# Patient Record
Sex: MALE | Race: WHITE | Employment: PART TIME | URBAN - NONMETROPOLITAN AREA
[De-identification: names, ages, dates, MRNs, and addresses within clinical notes are randomized per-mention and may not be internally consistent; named-entity substitution may affect disease eponyms.]

---

## 2021-12-28 ENCOUNTER — HOSPITAL ENCOUNTER (EMERGENCY)
Age: 66
Discharge: HOME | End: 2021-12-28

## 2021-12-28 VITALS
HEART RATE: 91 BPM | SYSTOLIC BLOOD PRESSURE: 156 MMHG | BODY MASS INDEX: 20.09 KG/M2 | RESPIRATION RATE: 20 BRPM | TEMPERATURE: 97.5 F | OXYGEN SATURATION: 97 % | DIASTOLIC BLOOD PRESSURE: 98 MMHG | HEIGHT: 76 IN | WEIGHT: 165 LBS

## 2021-12-28 DIAGNOSIS — U07.1 COVID-19 VIRUS INFECTION: Primary | ICD-10-CM

## 2021-12-28 LAB
FLU A ANTIGEN: NEGATIVE
FLU B ANTIGEN: NEGATIVE
SARS-COV-2, NAA: DETECTED

## 2021-12-28 PROCEDURE — 87804 INFLUENZA ASSAY W/OPTIC: CPT

## 2021-12-28 PROCEDURE — 99213 OFFICE O/P EST LOW 20 MIN: CPT | Performed by: EMERGENCY MEDICINE

## 2021-12-28 PROCEDURE — 99203 OFFICE O/P NEW LOW 30 MIN: CPT

## 2021-12-28 PROCEDURE — 87635 SARS-COV-2 COVID-19 AMP PRB: CPT

## 2021-12-28 ASSESSMENT — ENCOUNTER SYMPTOMS
DIARRHEA: 0
ABDOMINAL PAIN: 0
RHINORRHEA: 1
NAUSEA: 0
VOMITING: 0
BACK PAIN: 0
COUGH: 1
SHORTNESS OF BREATH: 0

## 2021-12-28 NOTE — ED PROVIDER NOTES
[place] [address]  Urgent Care Encounter       CHIEF COMPLAINT       Chief Complaint   Patient presents with    Covid Testing       Nurses Notes reviewed and I agree except as noted in the HPI. HISTORY OF PRESENT ILLNESS   Mikala Joy is a 61 y.o. male who presents for fatigue, mild sinus congestion, occasional cough. Patient states symptoms began yesterday. He states he was not feeling well and went to bed yesterday afternoon. He slept throughout the entire afternoon, evening and night and woke up this morning. He states that is unlike him. Patient is concerned for COVID-19. He has not been previously vaccinated. No known exposure. Patient is a current everyday smoker. States he currently does not have a primary care provider. HPI    REVIEW OF SYSTEMS     Review of Systems   Constitutional: Positive for fatigue. Negative for chills and fever. HENT: Positive for rhinorrhea. Respiratory: Positive for cough. Negative for shortness of breath. Cardiovascular: Negative for chest pain. Gastrointestinal: Negative for abdominal pain, diarrhea, nausea and vomiting. Musculoskeletal: Negative for back pain. Neurological: Negative for dizziness and headaches. Psychiatric/Behavioral: Negative for behavioral problems. PAST MEDICAL HISTORY   No past medical history on file. SURGICALHISTORY     Patient  has a past surgical history that includes Dental surgery. CURRENT MEDICATIONS       Previous Medications    No medications on file       ALLERGIES     Patient is has No Known Allergies. Patients   There is no immunization history on file for this patient. FAMILY HISTORY     Patient's family history includes Other in his father. SOCIAL HISTORY     Patient  reports that he has been smoking cigarettes. He has been smoking about 2.00 packs per day. He has never used smokeless tobacco. He reports current alcohol use. He reports that he does not use drugs.     PHYSICAL EXAM     ED TRIAGE VITALS  BP: (!) 156/98, Temp: 97.5 °F (36.4 °C), Pulse: 91, Resp: 20, SpO2: 97 %,Estimated body mass index is 20.08 kg/m² as calculated from the following:    Height as of this encounter: 6' 4\" (1.93 m). Weight as of this encounter: 165 lb (74.8 kg). ,No LMP for male patient. Physical Exam  Constitutional:       General: He is not in acute distress. Appearance: He is normal weight. He is not ill-appearing. HENT:      Nose: Rhinorrhea present. Mouth/Throat:      Mouth: Mucous membranes are moist.   Cardiovascular:      Rate and Rhythm: Normal rate and regular rhythm. Pulses: Normal pulses. Heart sounds: Normal heart sounds. Pulmonary:      Effort: Pulmonary effort is normal. No respiratory distress. Breath sounds: Normal breath sounds. No wheezing or rhonchi. Abdominal:      General: Abdomen is flat. Bowel sounds are normal. There is no distension. Skin:     General: Skin is warm and dry. Capillary Refill: Capillary refill takes less than 2 seconds. Neurological:      General: No focal deficit present. Mental Status: He is alert.    Psychiatric:         Mood and Affect: Mood normal.         Behavior: Behavior normal.         DIAGNOSTIC RESULTS     Labs:  Results for orders placed or performed during the hospital encounter of 12/28/21   COVID-19, Rapid   Result Value Ref Range    SARS-CoV-2, GILLIAN DETECTED (AA) NOT DETECTED   Rapid influenza A/B antigens   Result Value Ref Range    Flu A Antigen Negative NEGATIVE    Flu B Antigen Negative NEGATIVE       IMAGING:    No orders to display         EKG:      URGENT CARE COURSE:     Vitals:    12/28/21 0946 12/28/21 0947   BP:  (!) 156/98   Pulse: 91    Resp: 20    Temp: 97.5 °F (36.4 °C)    SpO2: 97%    Weight: 165 lb (74.8 kg)    Height: 6' 4\" (1.93 m)        Medications - No data to display         PROCEDURES:  None    FINAL IMPRESSION      1. COVID-19 virus infection          DISPOSITION/ PLAN     Patient presents for what is determined to be COVID-19 viral infection. Fatigue is his main concern. He is afebrile. No respiratory distress. Patient currently does not have primary care provider. I did discuss monoclonal antibodies with the patient. Advised if symptoms begin to worsen to go to the emergency department for treatment as he is at higher risk for Covid complications with his age and smoking status. PATIENT REFERRED TO:  Brandt Peña MD  [address] [place] [id number]      DISCHARGE MEDICATIONS:  New Prescriptions    No medications on file       Discontinued Medications    No medications on file       There are no discharge medications for this patient.       FLAVIO Napier - CNP    (Please note that portions of this note were completed with a voice recognition program. Efforts were made to edit the dictations but occasionally words are mis-transcribed.)          FLAVIO Napier - CNP  12/28/21 7099

## 2021-12-28 NOTE — ED NOTES
Discharge assessment complete. No changes. All discharge education and information given. Pt instructed to go to ED for any shortness of breath, chest pain or vomiting, diarrhea, Verbalized Understanding. Left stable. All belongings with patient.      Julius Barrera, LPN  70/91/89 7919

## 2021-12-29 ENCOUNTER — TELEPHONE (OUTPATIENT)
Dept: FAMILY MEDICINE CLINIC | Age: 66
End: 2021-12-29

## 2021-12-29 ENCOUNTER — CARE COORDINATION (OUTPATIENT)
Dept: CARE COORDINATION | Age: 66
End: 2021-12-29

## 2022-03-20 ENCOUNTER — APPOINTMENT (OUTPATIENT)
Dept: GENERAL RADIOLOGY | Age: 67
End: 2022-03-20

## 2022-03-20 ENCOUNTER — APPOINTMENT (OUTPATIENT)
Dept: CT IMAGING | Age: 67
End: 2022-03-20

## 2022-03-20 ENCOUNTER — HOSPITAL ENCOUNTER (EMERGENCY)
Age: 67
Discharge: HOME | End: 2022-03-21
Attending: EMERGENCY MEDICINE

## 2022-03-20 VITALS
HEIGHT: 76 IN | SYSTOLIC BLOOD PRESSURE: 183 MMHG | DIASTOLIC BLOOD PRESSURE: 119 MMHG | TEMPERATURE: 98.3 F | RESPIRATION RATE: 20 BRPM | HEART RATE: 99 BPM | OXYGEN SATURATION: 97 % | WEIGHT: 165 LBS | BODY MASS INDEX: 20.09 KG/M2
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
## 2022-03-20 DIAGNOSIS — M25.551 RIGHT HIP PAIN: Primary | ICD-10-CM

## 2022-03-20 LAB
ALBUMIN SERPL-MCNC: 4.3 G/DL (ref 3.5–5.1)
ALP BLD-CCNC: 138 U/L (ref 38–126)
ALT SERPL-CCNC: 11 U/L (ref 11–66)
ANION GAP SERPL CALCULATED.3IONS-SCNC: 15 MEQ/L (ref 8–16)
AST SERPL-CCNC: 9 U/L (ref 5–40)
BASOPHILS # BLD: 0.4 %
BASOPHILS ABSOLUTE: 0.1 THOU/MM3 (ref 0–0.1)
BILIRUB SERPL-MCNC: 0.7 MG/DL (ref 0.3–1.2)
BUN BLDV-MCNC: 22 MG/DL (ref 7–22)
CALCIUM SERPL-MCNC: 10.2 MG/DL (ref 8.5–10.5)
CHLORIDE BLD-SCNC: 101 MEQ/L (ref 98–111)
CO2: 24 MEQ/L (ref 23–33)
CREAT SERPL-MCNC: 0.8 MG/DL (ref 0.4–1.2)
EOSINOPHIL # BLD: 0.1 %
EOSINOPHILS ABSOLUTE: 0 THOU/MM3 (ref 0–0.4)
ERYTHROCYTE [DISTWIDTH] IN BLOOD BY AUTOMATED COUNT: 13.2 % (ref 11.5–14.5)
ERYTHROCYTE [DISTWIDTH] IN BLOOD BY AUTOMATED COUNT: 48.1 FL (ref 35–45)
GFR SERPL CREATININE-BSD FRML MDRD: > 90 ML/MIN/1.73M2
GLUCOSE BLD-MCNC: 109 MG/DL (ref 70–108)
HCT VFR BLD CALC: 44.5 % (ref 42–52)
HEMOGLOBIN: 14.3 GM/DL (ref 14–18)
IMMATURE GRANS (ABS): 0.14 THOU/MM3 (ref 0–0.07)
IMMATURE GRANULOCYTES: 0.8 %
LYMPHOCYTES # BLD: 4.5 %
LYMPHOCYTES ABSOLUTE: 0.7 THOU/MM3 (ref 1–4.8)
MAGNESIUM: 1.9 MG/DL (ref 1.6–2.4)
MCH RBC QN AUTO: 31.8 PG (ref 26–33)
MCHC RBC AUTO-ENTMCNC: 32.1 GM/DL (ref 32.2–35.5)
MCV RBC AUTO: 99.1 FL (ref 80–94)
MONOCYTES # BLD: 11.8 %
MONOCYTES ABSOLUTE: 1.9 THOU/MM3 (ref 0.4–1.3)
NUCLEATED RED BLOOD CELLS: 0 /100 WBC
OSMOLALITY CALCULATION: 283.3 MOSMOL/KG (ref 275–300)
PLATELET # BLD: 277 THOU/MM3 (ref 130–400)
PMV BLD AUTO: 10.8 FL (ref 9.4–12.4)
POTASSIUM REFLEX MAGNESIUM: 3.3 MEQ/L (ref 3.5–5.2)
RBC # BLD: 4.49 MILL/MM3 (ref 4.7–6.1)
SEG NEUTROPHILS: 82.4 %
SEGMENTED NEUTROPHILS ABSOLUTE COUNT: 13.6 THOU/MM3 (ref 1.8–7.7)
SODIUM BLD-SCNC: 140 MEQ/L (ref 135–145)
TOTAL PROTEIN: 7.9 G/DL (ref 6.1–8)
TROPONIN T: < 0.01 NG/ML
WBC # BLD: 16.5 THOU/MM3 (ref 4.8–10.8)

## 2022-03-20 PROCEDURE — 73700 CT LOWER EXTREMITY W/O DYE: CPT

## 2022-03-20 PROCEDURE — 93005 ELECTROCARDIOGRAM TRACING: CPT | Performed by: EMERGENCY MEDICINE

## 2022-03-20 PROCEDURE — 36415 COLL VENOUS BLD VENIPUNCTURE: CPT

## 2022-03-20 PROCEDURE — 80053 COMPREHEN METABOLIC PANEL: CPT

## 2022-03-20 PROCEDURE — 2500000003 HC RX 250 WO HCPCS: Performed by: EMERGENCY MEDICINE

## 2022-03-20 PROCEDURE — 96374 THER/PROPH/DIAG INJ IV PUSH: CPT

## 2022-03-20 PROCEDURE — 84484 ASSAY OF TROPONIN QUANT: CPT

## 2022-03-20 PROCEDURE — 6360000002 HC RX W HCPCS: Performed by: EMERGENCY MEDICINE

## 2022-03-20 PROCEDURE — 85025 COMPLETE CBC W/AUTO DIFF WBC: CPT

## 2022-03-20 PROCEDURE — 73502 X-RAY EXAM HIP UNI 2-3 VIEWS: CPT

## 2022-03-20 PROCEDURE — 83735 ASSAY OF MAGNESIUM: CPT

## 2022-03-20 PROCEDURE — 99285 EMERGENCY DEPT VISIT HI MDM: CPT

## 2022-03-20 PROCEDURE — 96375 TX/PRO/DX INJ NEW DRUG ADDON: CPT

## 2022-03-20 RX ORDER — KETOROLAC TROMETHAMINE 30 MG/ML
15 INJECTION, SOLUTION INTRAMUSCULAR; INTRAVENOUS ONCE
Status: COMPLETED | OUTPATIENT
Start: 2022-03-20 | End: 2022-03-20

## 2022-03-20 RX ORDER — LABETALOL 20 MG/4 ML (5 MG/ML) INTRAVENOUS SYRINGE
10 ONCE
Status: COMPLETED | OUTPATIENT
Start: 2022-03-20 | End: 2022-03-20

## 2022-03-20 RX ADMIN — KETOROLAC TROMETHAMINE 15 MG: 30 INJECTION, SOLUTION INTRAMUSCULAR; INTRAVENOUS at 23:36

## 2022-03-20 RX ADMIN — LABETALOL 20 MG/4 ML (5 MG/ML) INTRAVENOUS SYRINGE 10 MG: at 21:58

## 2022-03-20 ASSESSMENT — PAIN - FUNCTIONAL ASSESSMENT
PAIN_FUNCTIONAL_ASSESSMENT: 0-10

## 2022-03-20 ASSESSMENT — PAIN DESCRIPTION - LOCATION: LOCATION: HIP

## 2022-03-20 ASSESSMENT — PAIN SCALES - GENERAL
PAINLEVEL_OUTOF10: 10

## 2022-03-20 ASSESSMENT — PAIN DESCRIPTION - ORIENTATION: ORIENTATION: RIGHT

## 2022-03-20 ASSESSMENT — PAIN DESCRIPTION - PAIN TYPE: TYPE: ACUTE PAIN

## 2022-03-21 LAB
EKG ATRIAL RATE: 94 BPM
EKG P AXIS: 52 DEGREES
EKG P-R INTERVAL: 198 MS
EKG Q-T INTERVAL: 374 MS
EKG QRS DURATION: 94 MS
EKG QTC CALCULATION (BAZETT): 467 MS
EKG R AXIS: -13 DEGREES
EKG T AXIS: 55 DEGREES
EKG VENTRICULAR RATE: 94 BPM

## 2022-03-21 PROCEDURE — 93010 ELECTROCARDIOGRAM REPORT: CPT | Performed by: INTERNAL MEDICINE

## 2022-03-21 RX ORDER — CYCLOBENZAPRINE HCL 10 MG
10 TABLET ORAL 3 TIMES DAILY PRN
Qty: 30 TABLET | Refills: 0 | Status: SHIPPED | OUTPATIENT
Start: 2022-03-21 | End: 2022-03-31

## 2022-03-21 RX ORDER — IBUPROFEN 600 MG/1
600 TABLET ORAL EVERY 6 HOURS PRN
Qty: 120 TABLET | Refills: 0 | Status: SHIPPED | OUTPATIENT
Start: 2022-03-21 | End: 2022-06-28

## 2022-03-21 ASSESSMENT — ENCOUNTER SYMPTOMS
BACK PAIN: 0
TROUBLE SWALLOWING: 0
EYE REDNESS: 0
SHORTNESS OF BREATH: 0
NAUSEA: 0
ABDOMINAL PAIN: 0
VOMITING: 0
COUGH: 0

## 2022-03-21 NOTE — ED PROVIDER NOTES
[facility] [address] [address] EMERGENCY DEPT    EMERGENCY MEDICINE     Pt Name: Sheila Valdes  MRN: [id number]  [place] 1955  Date of evaluation: 3/20/2022  Provider: Ranulfo Dahl MD,     [address] [place] [address]       Chief Complaint   Patient presents with   Faizan Kugel    Hip Pain     Right       HISTORY OF PRESENT ILLNESS    Sheila Valdes is a pleasant 61 y.o. male who presents to the emergency department from home via EMS after falling at work. Patient states that his legs got weak and he fell onto his right hip. Patient states that this happened around 4 PM.  He has been experiencing pain in his right hip and his chin. Patient states that he has been having weakness in his legs for some time and occasionally they do give out. He stated that this was the first day at his new job and he had been walking for quite a period of time. He states that he has hurt his knee in the past and that is why his legs often give out on him. He denies being on any blood thinners, loss of consciousness or hitting his head. He did hit his chin. Patient states he had difficulty ambulating on the right hip after the injury. Triage notes and Nursing notes were reviewed by myself. Any discrepancies are addressed above. PAST MEDICAL HISTORY   History reviewed. No pertinent past medical history. SURGICAL HISTORY       Past Surgical History:   Procedure Laterality Date    DENTAL SURGERY      dental       CURRENT MEDICATIONS       Discharge Medication List as of 3/21/2022 12:03 AM          ALLERGIES     Patient has no known allergies.     FAMILY HISTORY       Family History   Problem Relation Age of Onset    Other Father         pulmonary fibrosisi        SOCIAL HISTORY       Social History     Socioeconomic History    Marital status:      Spouse name: None    Number of children: 3    Years of education: 15    Highest education level: None   Occupational History    Occupation: manager   Tobacco Use    Smoking status: Current Every Day Smoker     Packs/day: 2.00     Types: Cigarettes    Smokeless tobacco: Never Used    Tobacco comment: would like to try to quit,needs help   Substance and Sexual Activity    Alcohol use: Not Currently     Comment: daily    Drug use: No    Sexual activity: None   Other Topics Concern    None   Social History Narrative    None     Social Determinants of Health     Financial Resource Strain:     Difficulty of Paying Living Expenses: Not on file   Food Insecurity:     Worried About Running Out of Food in the Last Year: Not on file    Suzan of Food in the Last Year: Not on file   Transportation Needs:     Lack of Transportation (Medical): Not on file    Lack of Transportation (Non-Medical): Not on file   Physical Activity:     Days of Exercise per Week: Not on file    Minutes of Exercise per Session: Not on file   Stress:     Feeling of Stress : Not on file   Social Connections:     Frequency of Communication with Friends and Family: Not on file    Frequency of Social Gatherings with Friends and Family: Not on file    Attends Congregation Services: Not on file    Active Member of [address] [place] [address] or Organizations: Not on file    Attends Club or Organization Meetings: Not on file    Marital Status: Not on file   Intimate Partner Violence:     Fear of Current or Ex-Partner: Not on file    Emotionally Abused: Not on file    Physically Abused: Not on file    Sexually Abused: Not on file   Housing Stability:     Unable to Pay for Housing in the Last Year: Not on file    Number of [place] in the Last Year: Not on file    Unstable Housing in the Last Year: Not on file       REVIEW OF SYSTEMS     Review of Systems   Constitutional: Negative for fatigue and fever. HENT: Negative for congestion and trouble swallowing. Eyes: Negative for redness. Respiratory: Negative for cough and shortness of breath. Cardiovascular: Negative for chest pain.    Gastrointestinal: Negative for abdominal pain, nausea and vomiting. Genitourinary: Negative for difficulty urinating. Musculoskeletal: Positive for arthralgias and gait problem. Negative for back pain. Skin: Positive for wound. Negative for rash. Allergic/Immunologic: Negative for immunocompromised state. Neurological: Negative for light-headedness and headaches. Hematological: Does not bruise/bleed easily. Except as noted above the remainder of the review of systems was reviewed and is. PHYSICAL EXAM    (up to 7 for level 4, 8 or more for level 5)     ED Triage Vitals [03/20/22 2043]   BP Temp Temp Source Pulse Resp SpO2 Height Weight   (!) 215/129 98.3 °F (36.8 °C) Oral 99 18 98 % 6' 4\" (1.93 m) 165 lb (74.8 kg)       Physical Exam  Vitals and nursing note reviewed. Constitutional:       General: He is not in acute distress. Appearance: He is normal weight. He is not ill-appearing. HENT:      Head: Normocephalic. Abrasion present. No raccoon eyes, Weinberg's sign or contusion. Comments: Mild abrasion to right chin. Mouth/Throat:      Mouth: Mucous membranes are moist.      Dentition: Has dentures. Pharynx: Oropharynx is clear. Comments: No loose teeth as patient has dentures  Eyes:      Extraocular Movements: Extraocular movements intact. Pupils: Pupils are equal, round, and reactive to light. Cardiovascular:      Rate and Rhythm: Normal rate and regular rhythm. Heart sounds: No murmur heard. Pulmonary:      Effort: Pulmonary effort is normal. No respiratory distress. Breath sounds: Normal breath sounds. No decreased breath sounds. Abdominal:      General: Bowel sounds are normal.      Palpations: Abdomen is soft. Tenderness: There is no abdominal tenderness. There is no guarding or rebound. Genitourinary:     Testes:         Right: Testicular hydrocele present. Comments: Large hydrocele  Musculoskeletal:      Cervical back: Neck supple.  No spinous process tenderness or muscular Abnormal; Notable for the following components:    Glucose 109 (*)     Potassium reflex Magnesium 3.3 (*)     Alkaline Phosphatase 138 (*)     All other components within normal limits   TROPONIN   ANION GAP   GLOMERULAR FILTRATION RATE, ESTIMATED   MAGNESIUM   OSMOLALITY       All other labs were within normal range or not returned as of this dictation. Please note, any cultures that may have been sent were not resulted at the time of this patient visit. EMERGENCY DEPARTMENT COURSE andMedical Decision Making:     MDM  Number of Diagnoses or Management Options  Accident due to mechanical fall without injury, initial encounter  Right hip pain  Diagnosis management comments: 82-year-old male presents emergency room for right hip pain after a fall. Incidentally found to be hypertensive. Patient does state that he is very anxious and uncomfortable. Will obtain x-ray of the hip. We will also evaluate with CBC, CMP, troponin, EKG. This would be to rule out any hypertensive urgency or emergency. Patient did have a mechanical fall and I have low suspicion that this was any arrhythmia or syncope type picture as he is not indicating that.  /  ED Course as of 03/21/22 0144   Sun Mar 20, 2022   2325 Blood pressure improved with IV labetalol. Patient had been asymptomatic from the elevated blood pressure. It may have been secondary to pain given the injury of the fall. [DD]   2326 CT is negative for any fracture on the hip. Will attempt to ambulate the patient. [DD]   5302 Patient able to ambulate with assistance. He felt that he would be able to do better with his walker that he does have available to him at home. [DD]      ED Course User Index  [DD] Nancy Capps MD         The patient was evaluated during the global COVID-19 pandemic, and that diagnosis was considered upon their initial presentation.  Their evaluation, treatment and testing was consistent with current guidelines for patients who present with complaints or symptoms that may be related to COVID-19. Strict returnprecautions and follow up instructions were discussed with the patient with which the patient agrees        ED Medications administered this visit:    Medications   labetalol (NORMODYNE;TRANDATE) injection syringe 10 mg (10 mg IntraVENous Given 3/20/22 4320)   ketorolac (TORADOL) injection 15 mg (15 mg IntraVENous Given 3/20/22 6614)         Procedures: (None if blank)       CLINICAL       1. Right hip pain    2.  Accident due to mechanical fall without injury, initial encounter          DISPOSITION/PLAN   DISPOSITION Decision To Discharge 03/21/2022 12:01:01 AM      PATIENT REFERRED TO:  Your pcp    Schedule an appointment as soon as possible for a visit   If symptoms worsen      DISCHARGE MEDICATIONS:  Discharge Medication List as of 3/21/2022 12:03 AM      START taking these medications    Details   ibuprofen (IBU) 600 MG tablet Take 1 tablet by mouth every 6 hours as needed for Pain, Disp-120 tablet, R-0Normal      cyclobenzaprine (FLEXERIL) 10 MG tablet Take 1 tablet by mouth 3 times daily as needed for Muscle spasms, Disp-30 tablet, R-0Normal                    (Please note that portions of this note were completed with a voice recognition program.  Efforts were made to edit the dictations but occasionallywords are mis-transcribed.)      Electronically signed by Sal Payan MD on 3/20/22 at 8:53 PM EDT    Attending Physician, Emergency Department       Yonis Harris MD  03/21/22 6148

## 2022-03-21 NOTE — ED NOTES
Dr Davy Lazcano informed of pt's blood pressure remaining elevated. Pt is not symptomatic and appears very anxious at this time. No further orders were given at this time.       [employer], [place]  03/21/22 990 [address] [place] [address]  03/21/22 4383

## 2022-03-21 NOTE — ED NOTES
Pt resting on cot, family at bedside. No concerns expressed at this time.  [facility], [address], [address]  03/20/22 0341

## 2022-03-21 NOTE — ED TRIAGE NOTES
Pt presents to the ER via EMS after falling at work. Pt states his legs got weak and he fell on his right hip. Pt states this happened around 4pm. Pt states he is experiencing some pain in his right hip and chin. Pt states he hurt his knee in the past, so his right leg has never recovered from that. Pt denies blood thinners, LOC, or hitting his head. Pt denies chest pain and SOB. Pt alert and oriented, respirations even and unlabored.

## 2022-03-21 NOTE — ED NOTES
Attempted to ambulate pt. Pt able to stand at side of bed. Pt states does not think he can walk. Discussed using walker. Pt states he has one at home he can use. Ambulated pt with walker to doorway. Pt able to walk but states \"I have to take baby steps and if I move it the wrong way, it hurts and I think im going to fall. \" Pt ambulated back to bed with walker. Will update Dr. Michael Hoffmann.         Maria Victoria Reddy RN  03/20/22 8366

## 2022-06-27 ENCOUNTER — HOSPITAL ENCOUNTER (OUTPATIENT)
Age: 67
Discharge: HOME | End: 2022-06-27
Payer: COMMERCIAL

## 2022-06-27 ENCOUNTER — HOSPITAL ENCOUNTER (OUTPATIENT)
Dept: GENERAL RADIOLOGY | Age: 67
Discharge: HOME | End: 2022-06-27
Payer: COMMERCIAL

## 2022-06-27 ENCOUNTER — HOSPITAL ENCOUNTER (EMERGENCY)
Age: 67
Discharge: HOME | End: 2022-06-27
Attending: EMERGENCY MEDICINE
Payer: MEDICAID

## 2022-06-27 VITALS
SYSTOLIC BLOOD PRESSURE: 198 MMHG | RESPIRATION RATE: 20 BRPM | DIASTOLIC BLOOD PRESSURE: 128 MMHG | HEART RATE: 79 BPM | OXYGEN SATURATION: 98 % | TEMPERATURE: 97.9 F | WEIGHT: 160 LBS | BODY MASS INDEX: 19.48 KG/M2 | HEIGHT: 76 IN

## 2022-06-27 DIAGNOSIS — R52 PAIN: ICD-10-CM

## 2022-06-27 DIAGNOSIS — I10 HYPERTENSION, UNSPECIFIED TYPE: Primary | ICD-10-CM

## 2022-06-27 LAB
ALBUMIN SERPL-MCNC: 4.2 G/DL (ref 3.5–5.1)
ALP BLD-CCNC: 145 U/L (ref 38–126)
ALT SERPL-CCNC: 7 U/L (ref 11–66)
ANION GAP SERPL CALCULATED.3IONS-SCNC: 10 MEQ/L (ref 8–16)
AST SERPL-CCNC: 9 U/L (ref 5–40)
BASOPHILS # BLD: 1.1 %
BASOPHILS ABSOLUTE: 0.1 THOU/MM3 (ref 0–0.1)
BILIRUB SERPL-MCNC: 0.3 MG/DL (ref 0.3–1.2)
BILIRUBIN URINE: NEGATIVE
BLOOD, URINE: NEGATIVE
BUN BLDV-MCNC: 23 MG/DL (ref 7–22)
CALCIUM SERPL-MCNC: 9.9 MG/DL (ref 8.5–10.5)
CHARACTER, URINE: CLEAR
CHLORIDE BLD-SCNC: 104 MEQ/L (ref 98–111)
CO2: 26 MEQ/L (ref 23–33)
COLOR: YELLOW
CREAT SERPL-MCNC: 0.7 MG/DL (ref 0.4–1.2)
EKG ATRIAL RATE: 70 BPM
EKG P AXIS: 60 DEGREES
EKG P-R INTERVAL: 140 MS
EKG Q-T INTERVAL: 390 MS
EKG QRS DURATION: 98 MS
EKG QTC CALCULATION (BAZETT): 421 MS
EKG R AXIS: 33 DEGREES
EKG T AXIS: 55 DEGREES
EKG VENTRICULAR RATE: 70 BPM
EOSINOPHIL # BLD: 0.9 %
EOSINOPHILS ABSOLUTE: 0.1 THOU/MM3 (ref 0–0.4)
ERYTHROCYTE [DISTWIDTH] IN BLOOD BY AUTOMATED COUNT: 12.8 % (ref 11.5–14.5)
ERYTHROCYTE [DISTWIDTH] IN BLOOD BY AUTOMATED COUNT: 45.4 FL (ref 35–45)
GFR SERPL CREATININE-BSD FRML MDRD: > 90 ML/MIN/1.73M2
GLUCOSE BLD-MCNC: 87 MG/DL (ref 70–108)
GLUCOSE URINE: NEGATIVE MG/DL
HCT VFR BLD CALC: 41.7 % (ref 42–52)
HEMOGLOBIN: 13.7 GM/DL (ref 14–18)
IMMATURE GRANS (ABS): 0.08 THOU/MM3 (ref 0–0.07)
IMMATURE GRANULOCYTES: 0.7 %
KETONES, URINE: NEGATIVE
LEUKOCYTE ESTERASE, URINE: NEGATIVE
LYMPHOCYTES # BLD: 17.6 %
LYMPHOCYTES ABSOLUTE: 2 THOU/MM3 (ref 1–4.8)
MCH RBC QN AUTO: 31.8 PG (ref 26–33)
MCHC RBC AUTO-ENTMCNC: 32.9 GM/DL (ref 32.2–35.5)
MCV RBC AUTO: 96.8 FL (ref 80–94)
MONOCYTES # BLD: 14.1 %
MONOCYTES ABSOLUTE: 1.6 THOU/MM3 (ref 0.4–1.3)
NITRITE, URINE: NEGATIVE
NUCLEATED RED BLOOD CELLS: 0 /100 WBC
OSMOLALITY CALCULATION: 282.4 MOSMOL/KG (ref 275–300)
PH UA: 7 (ref 5–9)
PLATELET # BLD: 264 THOU/MM3 (ref 130–400)
PMV BLD AUTO: 11.1 FL (ref 9.4–12.4)
POTASSIUM REFLEX MAGNESIUM: 4.1 MEQ/L (ref 3.5–5.2)
PROTEIN UA: NEGATIVE
RBC # BLD: 4.31 MILL/MM3 (ref 4.7–6.1)
SEG NEUTROPHILS: 65.6 %
SEGMENTED NEUTROPHILS ABSOLUTE COUNT: 7.3 THOU/MM3 (ref 1.8–7.7)
SODIUM BLD-SCNC: 140 MEQ/L (ref 135–145)
SPECIFIC GRAVITY, URINE: 1.02 (ref 1–1.03)
TOTAL PROTEIN: 7.5 G/DL (ref 6.1–8)
TROPONIN T: < 0.01 NG/ML
UROBILINOGEN, URINE: 0.2 EU/DL (ref 0–1)
WBC # BLD: 11.2 THOU/MM3 (ref 4.8–10.8)

## 2022-06-27 PROCEDURE — 84484 ASSAY OF TROPONIN QUANT: CPT

## 2022-06-27 PROCEDURE — 93010 ELECTROCARDIOGRAM REPORT: CPT | Performed by: INTERNAL MEDICINE

## 2022-06-27 PROCEDURE — 36415 COLL VENOUS BLD VENIPUNCTURE: CPT

## 2022-06-27 PROCEDURE — 96374 THER/PROPH/DIAG INJ IV PUSH: CPT

## 2022-06-27 PROCEDURE — 96375 TX/PRO/DX INJ NEW DRUG ADDON: CPT

## 2022-06-27 PROCEDURE — 73560 X-RAY EXAM OF KNEE 1 OR 2: CPT

## 2022-06-27 PROCEDURE — 99284 EMERGENCY DEPT VISIT MOD MDM: CPT

## 2022-06-27 PROCEDURE — 6360000002 HC RX W HCPCS: Performed by: EMERGENCY MEDICINE

## 2022-06-27 PROCEDURE — 80053 COMPREHEN METABOLIC PANEL: CPT

## 2022-06-27 PROCEDURE — 6370000000 HC RX 637 (ALT 250 FOR IP): Performed by: EMERGENCY MEDICINE

## 2022-06-27 PROCEDURE — 2500000003 HC RX 250 WO HCPCS: Performed by: EMERGENCY MEDICINE

## 2022-06-27 PROCEDURE — 81003 URINALYSIS AUTO W/O SCOPE: CPT

## 2022-06-27 PROCEDURE — 93005 ELECTROCARDIOGRAM TRACING: CPT | Performed by: EMERGENCY MEDICINE

## 2022-06-27 PROCEDURE — 85025 COMPLETE CBC W/AUTO DIFF WBC: CPT

## 2022-06-27 RX ORDER — HYDRALAZINE HYDROCHLORIDE 20 MG/ML
10 INJECTION INTRAMUSCULAR; INTRAVENOUS ONCE
Status: COMPLETED | OUTPATIENT
Start: 2022-06-27 | End: 2022-06-27

## 2022-06-27 RX ORDER — LISINOPRIL 10 MG/1
10 TABLET ORAL DAILY
Qty: 14 TABLET | Refills: 0 | Status: SHIPPED | OUTPATIENT
Start: 2022-06-27 | End: 2022-06-28

## 2022-06-27 RX ORDER — CLONIDINE HYDROCHLORIDE 0.2 MG/1
0.2 TABLET ORAL ONCE
Status: COMPLETED | OUTPATIENT
Start: 2022-06-27 | End: 2022-06-27

## 2022-06-27 RX ORDER — AMLODIPINE BESYLATE 5 MG/1
10 TABLET ORAL DAILY
Qty: 28 TABLET | Refills: 0 | Status: SHIPPED | OUTPATIENT
Start: 2022-06-27 | End: 2022-06-28

## 2022-06-27 RX ORDER — METOPROLOL TARTRATE 5 MG/5ML
5 INJECTION INTRAVENOUS ONCE
Status: COMPLETED | OUTPATIENT
Start: 2022-06-27 | End: 2022-06-27

## 2022-06-27 RX ADMIN — HYDRALAZINE HYDROCHLORIDE 10 MG: 20 INJECTION, SOLUTION INTRAMUSCULAR; INTRAVENOUS at 19:24

## 2022-06-27 RX ADMIN — CLONIDINE HYDROCHLORIDE 0.2 MG: 0.2 TABLET ORAL at 20:26

## 2022-06-27 RX ADMIN — METOPROLOL TARTRATE 5 MG: 1 INJECTION, SOLUTION INTRAVENOUS at 18:04

## 2022-06-27 ASSESSMENT — ENCOUNTER SYMPTOMS
SHORTNESS OF BREATH: 0
BACK PAIN: 0
ABDOMINAL PAIN: 0
VOMITING: 0
COUGH: 0

## 2022-06-28 ENCOUNTER — OFFICE VISIT (OUTPATIENT)
Dept: INTERNAL MEDICINE CLINIC | Age: 67
End: 2022-06-28
Payer: MEDICAID

## 2022-06-28 VITALS
SYSTOLIC BLOOD PRESSURE: 158 MMHG | BODY MASS INDEX: 18.66 KG/M2 | HEIGHT: 76 IN | TEMPERATURE: 97.1 F | OXYGEN SATURATION: 99 % | HEART RATE: 84 BPM | DIASTOLIC BLOOD PRESSURE: 96 MMHG | WEIGHT: 153.2 LBS

## 2022-06-28 DIAGNOSIS — Z72.0 TOBACCO ABUSE: ICD-10-CM

## 2022-06-28 DIAGNOSIS — I10 PRIMARY HYPERTENSION: Primary | ICD-10-CM

## 2022-06-28 PROCEDURE — 99204 OFFICE O/P NEW MOD 45 MIN: CPT | Performed by: STUDENT IN AN ORGANIZED HEALTH CARE EDUCATION/TRAINING PROGRAM

## 2022-06-28 RX ORDER — AMLODIPINE BESYLATE 5 MG/1
5 TABLET ORAL DAILY
Qty: 30 TABLET | Refills: 5 | Status: SHIPPED | OUTPATIENT
Start: 2022-06-28 | End: 2022-07-19

## 2022-06-28 SDOH — ECONOMIC STABILITY: FOOD INSECURITY: WITHIN THE PAST 12 MONTHS, THE FOOD YOU BOUGHT JUST DIDN'T LAST AND YOU DIDN'T HAVE MONEY TO GET MORE.: NEVER TRUE

## 2022-06-28 SDOH — ECONOMIC STABILITY: FOOD INSECURITY: WITHIN THE PAST 12 MONTHS, YOU WORRIED THAT YOUR FOOD WOULD RUN OUT BEFORE YOU GOT MONEY TO BUY MORE.: NEVER TRUE

## 2022-06-28 ASSESSMENT — PATIENT HEALTH QUESTIONNAIRE - PHQ9
2. FEELING DOWN, DEPRESSED OR HOPELESS: 0
1. LITTLE INTEREST OR PLEASURE IN DOING THINGS: 0
SUM OF ALL RESPONSES TO PHQ QUESTIONS 1-9: 0
SUM OF ALL RESPONSES TO PHQ9 QUESTIONS 1 & 2: 0
SUM OF ALL RESPONSES TO PHQ QUESTIONS 1-9: 0

## 2022-06-28 ASSESSMENT — SOCIAL DETERMINANTS OF HEALTH (SDOH): HOW HARD IS IT FOR YOU TO PAY FOR THE VERY BASICS LIKE FOOD, HOUSING, MEDICAL CARE, AND HEATING?: SOMEWHAT HARD

## 2022-06-28 NOTE — PROGRESS NOTES
[facility] Internal Medicine  [facility] [address] [facility], [address]  Dept: [phone]  Dept Fax: [phone]    Gopi Giles 1955 is a 61 y.o. male, New patient, here for evaluations of:  Chief Complaint   Patient presents with   [address] from Hospital     ER f/u 6/27  HTN          ASSESSMENT/PLAN:    1. Primary HTN:  - -160 on visit today  - Discussed risks and benefits of Hypertensive medication  - Discussed risks of not starting medication  - Patient agreed to start Amlodipine 5 mg daily  - Instructed to recheck blood pressure daily (at least)  - Instructed to fax numbers over in 1 week, we can titrate medications from there  - If uncontrolled on Amlodipine, Lisinopril will be the next choice    2. Tobacco Abuse:  - Current smoker with 0.25 to 0.75 packs per day  - Has been cutting back on his own  - Discussed continuing to cut back on amount with goal of stopping if at 1 or 2 cigarettes a day  - If he needs help, we have offered patches and gum, can consider Chantix generic      Return in about 3 months (around 9/28/2022). Diagnostic data:   I have reviewed recent diagnostic testing including labs with patient today. I have reviewed the notes from the prior visit  I have reviewed the labs for CMP, CBC, and Troponin. SUBJECTIVE/OBJECTIVE:    Gopi Giles is a 61 y.o. male who presents for an ER follow-up. He sees Dr. Sayra Balderrama for his PCP. Primary HTN:  Known history of, states he stopped taking his medication outpatient. States had it for over 10 years. No history of heart attacks or strokes. No history of kidney issues. Mother had a major ischemic stroke in 2014. He was given metoprolol, hydralazine, and clonidine in the ER. He was discharged with Amlodipine 10 mg, and Lisinopril 10 mg. No history of migraines. No history of AAA. Right Hip Pain/Right Knee pain:  Takes Ibuprofen outpatient.   States occurring after a fall, working on getting disability. States leg and toe drag on occasion. Previously able to work off and on. Hydrocele:  Right testicle, not surgically removed, doesn't bother him. Tobacco Use:  Down to 0.5-0.75 packs per day. Previous COVID infection:  Caught lost year. Very fatigued when he first caught COVID, able to return to work. Worried about long COVID. Mild Leukocytosis:  WBC 11.4 on ER visit. Patient is a chronic smoker, no signs of infection at this time. Continue to monitor.    ---------------------------------------------------------------------------------    Review of Systems - General ROS: negative for - chills or fever  Psychological ROS: negative for - anxiety and depression  Hematological and Lymphatic ROS: No history of blood clots or bleeding disorder. Respiratory ROS: no cough, shortness of breath, or wheezing  Cardiovascular ROS: no chest pain or dyspnea on exertion, tolerates a flight of stairs, vacuuming  Gastrointestinal ROS: no abdominal pain, change in bowel habits, or black or bloody stools  Genito-Urinary ROS: no dysuria, trouble voiding, or hematuria  Musculoskeletal ROS: Pain in right knee, resolving pain in right hip negative for - muscle pain or muscular weakness  Neurological ROS: Occasionally right leg will drag on the floor negative for - headaches, numbness/tingling, seizures or weakness  Dermatological ROS: negative for - rash or skin lesion changes    Blood pressure (!) 158/96, pulse 84, temperature 97.1 °F (36.2 °C), height 6' 3.98\" (1.93 m), weight 153 lb 3.2 oz (69.5 kg), SpO2 99 %.     Physical Examination: General appearance - alert, well appearing, and in no distress  Mental status - alert, oriented to person, place, and time  Head - atraumatic, normocephalic  Eyes - pupils equal and reactive to light, extraocular muscles intact  Ears - external ears are normal, hearing is grossly normal  Mouth - oral pharynx clear, mucous membranes moist  Neck - supple, no significant adenopathy  Chest - clear to auscultation, no wheezes, rales or rhonchi, symmetric air entry  Heart - normal rate, regular rhythm, normal S1, S2, no murmurs, rubs, clicks or gallops  Abdomen - soft, nontender, non-distended, hydrocele noted of visual examination, not palpated. Neurological - alert, oriented, cranial nerves II-XII intact, motor and sensation are grossly intact bilateral upper and lower extremities. He has instability of the right knee which prevent him from weight bearing while walking  Extremities - peripheral pulses normal, no pedal edema, no clubbing or cyanosis  Skin - warm and dry    An electronic signature was used to authenticate this note. --Suzie Smith DO  PGY-2 on 6/28/2022 seen with Dr. Antonio Alvarado. [facility] INTERNAL MEDICINE  [address]  Dept: [phone]  Dept Fax: 96 : [phone]

## 2022-06-28 NOTE — PATIENT INSTRUCTIONS
Make sure to take blood pressure readings every day and record them. Call the office in one week with results. Our goal will be a blood pressure around 120/80. Systolic goal 408 to 462, Diastolic goal 75 to 90.

## 2022-06-28 NOTE — ED PROVIDER NOTES
[facility] [address] [address] EMERGENCY DEPT    EMERGENCY MEDICINE     Pt Name: Sabi Olivares  MRN: [id number]  [place] 1955  Date of evaluation: 6/27/2022  Provider: Danya Keith MD    CHIEF COMPLAINT       Chief Complaint   Patient presents with    Hypertension       HISTORY OF PRESENT ILLNESS    Sabi Olivares is a pleasant 61 y.o. male   Presents to the emergency department from home after he was found to have high blood pressure at his PCP's office. Pt denies having any symptoms. He reports he feels fine and that he knows he has high blood pressure. He was prescribed medication, but stopped taking it. He denies any current symptoms. No cp/sob, no weakness or numbness. No other complaints, no other known exacerbating/relieving factors. Triage notes and Nursing notes were reviewed by myself. Any discrepancies are addressed above. PAST MEDICAL HISTORY     Past Medical History:   Diagnosis Date    Hydrocele        SURGICAL HISTORY       Past Surgical History:   Procedure Laterality Date    DENTAL SURGERY      dental       CURRENT MEDICATIONS       Previous Medications    IBUPROFEN (IBU) 600 MG TABLET    Take 1 tablet by mouth every 6 hours as needed for Pain       ALLERGIES     Patient has no known allergies.     FAMILY HISTORY       Family History   Problem Relation Age of Onset    Other Father         pulmonary fibrosisi        SOCIAL HISTORY       Social History     Socioeconomic History    Marital status:      Spouse name: None    Number of children: 3    Years of education: 15    Highest education level: None   Occupational History    Occupation: manager   Tobacco Use    Smoking status: Current Every Day Smoker     Packs/day: 2.00     Types: Cigarettes    Smokeless tobacco: Never Used    Tobacco comment: would like to try to quit,needs help   Substance and Sexual Activity    Alcohol use: Not Currently     Comment: daily    Drug use: No    Sexual activity: None   Other Topics Concern    None   Social History Narrative    None     Social Determinants of Health     Financial Resource Strain:     Difficulty of Paying Living Expenses: Not on file   Food Insecurity:     Worried About Running Out of Food in the Last Year: Not on file    Suzan of Food in the Last Year: Not on file   Transportation Needs:     Lack of Transportation (Medical): Not on file    Lack of Transportation (Non-Medical): Not on file   Physical Activity:     Days of Exercise per Week: Not on file    Minutes of Exercise per Session: Not on file   Stress:     Feeling of Stress : Not on file   Social Connections:     Frequency of Communication with Friends and Family: Not on file    Frequency of Social Gatherings with Friends and Family: Not on file    Attends [employer] Services: Not on file    Active Member of [address] [place] [address] [employer] or Organizations: Not on file    Attends Club or Organization Meetings: Not on file    Marital Status: Not on file   Intimate Partner Violence:     Fear of Current or Ex-Partner: Not on file    Emotionally Abused: Not on file    Physically Abused: Not on file    Sexually Abused: Not on file   Housing Stability:     Unable to Pay for Housing in the Last Year: Not on file    Number of [place] in the Last Year: Not on file    Unstable Housing in the Last Year: Not on file       REVIEW OF SYSTEMS     Review of Systems   Constitutional: Negative for chills and fever. Respiratory: Negative for cough and shortness of breath. Cardiovascular: Negative for chest pain and leg swelling. Gastrointestinal: Negative for abdominal pain and vomiting. Musculoskeletal: Negative for back pain and neck pain. Skin: Negative for rash and wound. Neurological: Negative for weakness and numbness. All other systems reviewed and are negative. Except as noted above the remainder of the review of systems was reviewed and is.    PHYSICAL EXAM    (up to 7 for level 4, 8 or more for level 5)     ED Triage Vitals   BP Temp Temp Source Heart Rate Resp SpO2 Height Weight   06/27/22 1720 06/27/22 1719 06/27/22 1719 06/27/22 1719 06/27/22 1719 06/27/22 1719 06/27/22 1719 06/27/22 1719   (!) 216/120 97.9 °F (36.6 °C) Oral 70 16 98 % 6' 4\" (1.93 m) 160 lb (72.6 kg)       Physical Exam  Vitals and nursing note reviewed. Constitutional:       General: He is awake. HENT:      Head: Normocephalic and atraumatic. Mouth/Throat:      Mouth: Mucous membranes are moist.   Eyes:      General: Lids are normal.      Conjunctiva/sclera: Conjunctivae normal.   Neck:      Vascular: No JVD. Trachea: No tracheal deviation. Cardiovascular:      Rate and Rhythm: Normal rate and regular rhythm. Pulses: Normal pulses. Heart sounds: No murmur heard. No friction rub. No gallop. Pulmonary:      Effort: Pulmonary effort is normal.      Breath sounds: Normal breath sounds. No wheezing, rhonchi or rales. Abdominal:      Palpations: Abdomen is soft. Tenderness: There is no abdominal tenderness. Musculoskeletal:      Cervical back: Neck supple. Skin:     General: Skin is warm. Findings: No rash. Neurological:      General: No focal deficit present. Mental Status: He is alert. Sensory: No sensory deficit. Motor: No weakness. Psychiatric:         Behavior: Behavior is cooperative.          DIAGNOSTIC RESULTS     EKG:(none if blank)  All E[facility]'s are [facility] who either signs or Co-signs this chart in the absence of a cardiologist.    Normal sinus rhythm  Incomplete right bundle branch block    RADIOLOGY: (none if blank)   Interpretation per the Radiologistbelow, if available at the time of this note:    No orders to display       LABS:  Labs Reviewed   CBC WITH AUTO DIFFERENTIAL - Abnormal; Notable for the following components:       Result Value    WBC 11.2 (*)     RBC 4.31 (*)     Hemoglobin 13.7 (*)     Hematocrit 41.7 (*)     MCV 96.8 (*)     RDW-SD 45.4 (*)     Monocytes Absolute 1.6 (*)     Immature Grans (Abs) 0.08 (*)     All other components within normal limits   COMPREHENSIVE METABOLIC PANEL W/ REFLEX TO MG FOR LOW K - Abnormal; Notable for the following components:    BUN 23 (*)     Alkaline Phosphatase 145 (*)     ALT 7 (*)     All other components within normal limits   TROPONIN   URINALYSIS WITH REFLEX TO CULTURE   ANION GAP   OSMOLALITY   GLOMERULAR FILTRATION RATE, ESTIMATED       All other labs were within normal range or not returned as of this dictation. Please note, any cultures that may have been sent were not resulted at the time of this patient visit. EMERGENCY DEPARTMENT COURSE andMedical Decision Making:     MDM/   Pt presents to the Er with asymptomatic HTN, suspect bp chronically this high and patient just does not see a doctor. No evidence of end organ damage, however given the degree of htn with a diastolic of 650 despite initial treatment, I have recommended either better control in ED or admission. Pt refusing either of these and will not even wait in ED for better control with meds here as he wants to leave and states his bp is always this high. Will sign out AMA, rx for meds for home, patient scheduled for pcp f/u tomorrow, he reports he will go to this appointment. He verbalizes understanding and acceptance of risks of leaving ama, including heart attack, stroke, death, or permanent disability. No indication for involuntary hold. Strict returnprecautions and follow up instructions were discussed with the patient with which the patient agrees      ED Medications administered this visit:    Medications   metoprolol (LOPRESSOR) injection 5 mg (5 mg IntraVENous Given 6/27/22 1804)   hydrALAZINE (APRESOLINE) injection 10 mg (10 mg IntraVENous Given 6/27/22 1924)   cloNIDine (CATAPRES) tablet 0.2 mg (0.2 mg Oral Given 6/27/22 2026)         Procedures: (None if blank)         CLINICAL IMPRESSION     1.  Hypertension, unspecified type          DISPOSITION/PLAN   DISPOSITION [place] 06/27/2022 09:37:37 PM      PATIENT REFERRED TO:  [address] [facility],[address]  [address] [facility] [address] [phone]  In 1 day        DISCHARGE MEDICATIONS:  New Prescriptions    AMLODIPINE (NORVASC) 5 MG TABLET    Take 2 tablets by mouth daily for 14 days    LISINOPRIL (PRINIVIL;ZESTRIL) 10 MG TABLET    Take 1 tablet by mouth daily for 14 days           (Please note that portions of this note were completed with a voice recognition program.  Efforts were made to edit the dictations but occasionallywords are mis-transcribed.)      Kyle Sin MD,FACEP (electronically signed)  Attending Physician, Emergency Department        Isrrael Doshi MD  06/27/22 2043

## 2022-07-19 DIAGNOSIS — I10 PRIMARY HYPERTENSION: ICD-10-CM

## 2022-07-19 RX ORDER — AMLODIPINE BESYLATE 5 MG/1
10 TABLET ORAL DAILY
Qty: 30 TABLET | Refills: 3 | Status: SHIPPED | OUTPATIENT
Start: 2022-07-19 | End: 2022-07-20

## 2022-07-19 NOTE — PROGRESS NOTES
Patient sent in BP values, -189 and -120. Notebly, his BP was highest on 6/29, and relatively well controlled on 7/15. This likely represents a good response to therapy. However, since the BP is still elevated, we will increase the Norvasc is 10 mg daily today. New script for Norvasc will be signed and sent. Patient should continue monitoring blood pressure daily and record values for next visit.     Yasir Ambriz, DO

## 2022-07-20 RX ORDER — AMLODIPINE BESYLATE 10 MG/1
10 TABLET ORAL DAILY
Qty: 30 TABLET | Refills: 0 | OUTPATIENT
Start: 2022-07-20 | End: 2022-09-27 | Stop reason: SDUPTHER

## 2022-07-20 NOTE — TELEPHONE ENCOUNTER
Previous script written for 5 mg.  2 tab daily disp #30 and 3 refills. I spoke with Dr. Wilton Reyes and she gave the OK to correct script to the 10 mg. Size at 1 tab daily. I called in to [employer] and spoke to Navi.

## 2022-07-20 NOTE — PROGRESS NOTES
Pt was notified that Dr. Claudia Glass reviewed BPs and would like him to increase Norvasc from 5 mg. Daily to 10 mg. Daily and bring in pressures to office on next appt. I see the script is written for 5 mg. To take 2 tab daily and only #30 were disp for 15 day supply.   I called pharmacy and corrected script

## 2022-09-26 NOTE — PROGRESS NOTES
[facility] Internal Medicine  [address] [place], [address]  Dept: [phone]  Dept Fax: [phone]    Aron Mata 1955 is a 61 y.o. male, Established patient, here for evaluations of:  Chief Complaint   Patient presents with    Follow-up     High blood pressure          ASSESSMENT/PLAN:    1. Primary hypertension   - Initially SBP 180s, now down to 130-150  - Taking Amlodipine 10 mg daily, no side effects  - Discussed starting new medication, patient agreeable  - Refilling Amlodipine  - Starting Lisinopril 5 mg daily  - Recommended to continue taking BP daily     2. Tobacco abuse   - Approximately 0.5 packs per day, trying to quit on own  - Cravings worse in the morning, discussed habit changes  - Discussed gum, patch, and Chantix today  - Patient declines assistance at this time  - Previously has declined CT of chest, continue to monitor     3. Hydrocele, right   - Noticeable without explicit examination  - No causing any issues currently  - Appears larger than previous visit  - Referral placed to Urology for potential treatment       Return in about 3 months (around 12/27/2022). Diagnostic data:   I have reviewed recent diagnostic testing including labs with patient today. I have reviewed the notes from the prior visit. No new labs to review. SUBJECTIVE/OBJECTIVE:    Aron Mata is a 61 y.o. male who presents for a 3 month follow-up for HTN, Tobacco use, and Right hip pain. Primary HTN:  Initially had stopped taking his medication outpatient due to not noticing a difference. No history of heart attacks or strokes. No history of kidney issues. Mother had a major ischemic stroke in 2014. Started on 5 mg Amlopidine on initial visit, increased to 10 mg 2 weeks later due to high values. No history of AAA. No headaches, blurry vision, syncope, lightheadedness. Stated good tolerance of amlodipine. SBP running between 130-150 outpatient consistently. Right Hip Pain/Right Knee pain:  Takes Ibuprofen outpatient. States occurring after a fall, working on getting disability. States leg and toe drag on occasion. Previously able to work off and on. Currently using a cane today. Has fear of loosing balance. Able to walk up steps, but SLOWLY. Hydrocele:  Right testicle, not surgically removed, doesn't bother him. Discussed that it may cause issues in the future, okay with Urology appointment. Tobacco Use:  Down to 0.5-0.75 packs per day. Still staying under a pack a day. Bigger cravings in the morning as opposed to at night. Not interseted in gum or patch today. Previous COVID infection:  Caught lost year. Very fatigued when he first caught COVID, able to return to work. Worried about long COVID. Fatigue is doing better, but feels lazy since retiring. Mild Leukocytosis:  WBC 11.4 on ER visit. Patient is a chronic smoker, no signs of infection at this time. Continue to monitor.    ---------------------------------------------------------------------------------    Review of Systems:  General ROS: negative for - chills or fever  Psychological ROS: negative for - anxiety and depression  Hematological and Lymphatic ROS: No history of blood clots or bleeding disorder.    Respiratory ROS: no cough, shortness of breath, or wheezing  Cardiovascular ROS: no chest pain or dyspnea on exertion, tolerates a flight of stairs, vacuuming  Gastrointestinal ROS: no abdominal pain, change in bowel habits, or black or bloody stools  Genito-Urinary ROS: no dysuria, trouble voiding, or hematuria  Musculoskeletal ROS: Right knee pain, chronic, difficulty climbing stairs negative for - muscle pain or muscular weakness  Neurological ROS: negative for - headaches, numbness/tingling, seizures or weakness  Dermatological ROS: negative for - rash or skin lesion changes    Blood pressure (!) 152/94, pulse 72, temperature 98.4 °F (36.9 °C), weight 153 lb 6.4 oz (69.6

## 2022-09-27 ENCOUNTER — OFFICE VISIT (OUTPATIENT)
Dept: INTERNAL MEDICINE CLINIC | Age: 67
End: 2022-09-27
Payer: MEDICAID

## 2022-09-27 VITALS
SYSTOLIC BLOOD PRESSURE: 152 MMHG | HEART RATE: 72 BPM | TEMPERATURE: 98.4 F | BODY MASS INDEX: 18.68 KG/M2 | WEIGHT: 153.4 LBS | DIASTOLIC BLOOD PRESSURE: 94 MMHG

## 2022-09-27 DIAGNOSIS — Z72.0 TOBACCO ABUSE: ICD-10-CM

## 2022-09-27 DIAGNOSIS — I10 PRIMARY HYPERTENSION: Primary | ICD-10-CM

## 2022-09-27 DIAGNOSIS — N43.3 HYDROCELE, RIGHT: ICD-10-CM

## 2022-09-27 PROCEDURE — 99214 OFFICE O/P EST MOD 30 MIN: CPT | Performed by: STUDENT IN AN ORGANIZED HEALTH CARE EDUCATION/TRAINING PROGRAM

## 2022-09-27 RX ORDER — LISINOPRIL 5 MG/1
5 TABLET ORAL DAILY
Qty: 30 TABLET | Refills: 2 | Status: SHIPPED | OUTPATIENT
Start: 2022-09-27

## 2022-09-27 RX ORDER — AMLODIPINE BESYLATE 10 MG/1
10 TABLET ORAL DAILY
Qty: 30 TABLET | Refills: 2 | Status: SHIPPED | OUTPATIENT
Start: 2022-09-27

## 2022-09-27 NOTE — PATIENT INSTRUCTIONS
Continue taking both Amlodipine 10 mg and Lisinopril 5 mg daily. We will get you set up with Urology for Hydrocele.

## 2022-09-28 ENCOUNTER — OFFICE VISIT (OUTPATIENT)
Dept: UROLOGY | Age: 67
End: 2022-09-28
Payer: MEDICAID

## 2022-09-28 VITALS
BODY MASS INDEX: 19.02 KG/M2 | HEIGHT: 75 IN | WEIGHT: 153 LBS | DIASTOLIC BLOOD PRESSURE: 82 MMHG | SYSTOLIC BLOOD PRESSURE: 132 MMHG

## 2022-09-28 DIAGNOSIS — N43.2 OTHER HYDROCELE: Primary | ICD-10-CM

## 2022-09-28 PROCEDURE — 99204 OFFICE O/P NEW MOD 45 MIN: CPT | Performed by: UROLOGY

## 2022-09-28 NOTE — PROGRESS NOTES
Dr. Camille Clark MD  [facility])  Urology Clinic  New Patient Visit      Patient:  Shanell Virgen  YOB: 1955  Date: 9/28/2022    HISTORY OF PRESENT ILLNESS:   The patient is a 61 y.o. male who presents today for evaluation of the following problem(s): right hydrocele. Overall the problem(s) : are worsening. Associated Symptoms: No dysuria, gross hematuria. Pain Severity: 0/10    Summary of old records:   None    Imaging/Labs reviewed during today's visit:  I have independently reviewed and verified the following films during today's visit. None    Last several PSA's:  No results found for: PSA    Last total testosterone:  No results found for: TESTOSTERONE    Urinalysis today:  No results found for this visit on 09/28/22. Last BUN and creatinine:  Lab Results   Component Value Date    BUN 23 (H) 06/27/2022     Lab Results   Component Value Date    CREATININE 0.7 06/27/2022       Imaging Reviewed during this Office Visit:   (results were independently reviewed by physician and radiology report verified)    PAST MEDICAL, FAMILY AND SOCIAL HISTORY:  Past Medical History:   Diagnosis Date    Hydrocele     Hypertension      Past Surgical History:   Procedure Laterality Date    DENTAL SURGERY  2013    dental     Family History   Problem Relation Age of Onset    Stroke Mother         200    Other Mother         migraines    Other Father         pulmonary fibrosisi     Outpatient Medications Marked as Taking for the 9/28/22 encounter (Office Visit) with Camille Clark MD   Medication Sig Dispense Refill    lisinopril (PRINIVIL;ZESTRIL) 5 MG tablet Take 1 tablet by mouth daily 30 tablet 2    amLODIPine (NORVASC) 10 MG tablet Take 1 tablet by mouth daily 30 tablet 2    MULTIPLE VITAMIN PO Take by mouth daily         Patient has no known allergies.   Social History     Tobacco Use   Smoking Status Every Day    Packs/day: 1.00    Years: 52.00    Pack years: 52.00    Types: Cigarettes    Start date: 1974   Smokeless Tobacco Never       Social History     Substance and Sexual Activity   Alcohol Use Not Currently    Comment: daily--hasn't drank since jonna       REVIEW OF SYSTEMS:  Constitutional: negative  Eyes: negative  Respiratory: negative  Cardiovascular: negative  Gastrointestinal: negative  Musculoskeletal: negative  Genitourinary: negative except for what is in HPI  Skin: negative   Neurological: negative  Hematological/Lymphatic: negative  Psychological: negative    Physical Exam:    This a 61 y.o. male   Vitals:    09/28/22 1538   BP: 132/82     Constitutional: Patient in no acute distress; Neuro: alert and oriented to person place and time. Psych: Mood and affect normal.  Skin: Normal  Lungs: Respiratory effort normal  Cardiovascular:  Normal peripheral pulses  Abdomen: Soft, non-tender, non-distended with no CVA, flank pain, hepatosplenomegaly or hernia. Kidneys normal.  Bladder non-tender and not distended. Lymphatics: no palpable lymphadenopathy  Penis normal and circumcised  Urethral meatus normal  Scrotal exam normal  Testicles  showed large right hydrocele. Assessment and Plan      1. Other hydrocele           Plan:      No follow-ups on file. Will proceed with right hydrocele repair with drain placement.           Dr. Sidra Chappell MD

## 2022-10-06 ENCOUNTER — TELEPHONE (OUTPATIENT)
Dept: UROLOGY | Age: 67
End: 2022-10-06

## 2022-10-06 DIAGNOSIS — Z01.818 PRE-OP TESTING: ICD-10-CM

## 2022-10-06 DIAGNOSIS — N43.2 OTHER HYDROCELE: Primary | ICD-10-CM

## 2022-10-06 NOTE — TELEPHONE ENCOUNTER
SURGERY [address]  [address] [place] [address] [address] [facility] [address] [place] [address], [address]      Phone *[phone]   Surgical Scheduling Direct Line Phone *[phone] Fax *[phone]      Flakita Castañeda 1955 male    [address] [facility]   Marital Status:          Home Phone: [phone]      Cell Phone:    Telephone Information:   Mobile [phone]          Surgeon: Dr. Denise Curran Surgery Date: 10/26/2022   Time: 12:30pm    Procedure: Right Hydrocelectomy with drain placement    Diagnosis: Right Hydrocele     Important Medical History:  In Epic    Special Inst/Equip:     CPT Codes:    93136  Latex Allergy: No     Cardiac Device:  No    Anesthesia:  General          Admission Type:  Same Day                        Admit Prior to Day of Surgery: No    Case Location:  Main OR            Preadmission Testing:  Phone Call          PAT Date and Time:______________________________________________________    PAT Confirmation #: ______________________________________________________    Post Op Visit: ___________________________________________________________    Need Preop Cardiac Clearance: No    Does Patient have Cardiologist/physician?     none    Surgery Confirmation #: __________________________________________________    : ________________________   Date: __________________________     [employer] Name: [place] Medicaid

## 2022-10-06 NOTE — TELEPHONE ENCOUNTER
Patient scheduled with Dr. Annmarie Peter on 10/26/2022. Surgery consent to be done upon arrival.  Patient to do urine culture, fasting labs and chest xray on 10/12/2022. Surgery instructions mailed to patient. Patient will have an adult over the age of 25 with them at discharge and 24 hours after procedure.

## 2022-10-06 NOTE — TELEPHONE ENCOUNTER
Patient to have Right Hydrocelectomy w/ drain placement on 10/26/2022. Once suture removed and Penrose drain removed without difficulty. Drain intact. Drain site without redness and is healing. Sterile dressing applied. Patient was advised to   *keep area clean and dry, shower only no tub bath. *roll up a towel and place under his scrotum to alleviate pain and help reduce swelling. *use a jock strap for support and apply ice as needed for comfort. *avoid any heavy lifting, pushing, pulling or straining until follow up visit. Call office at [phone] if any questions. Patient to follow up with [facility] Urology as directed.

## 2022-10-06 NOTE — TELEPHONE ENCOUNTER
DO NOT TAKE ASPIRIN,  FISH OIL, IBUPROFEN, MOTRIN-LIKE DRUGS AND ANY MULTIVITAMINS OR OVER THE COUNTER SUPPLEMENTS 14 DAYS PRIOR TO SURGERY. MUST HAVE AN ADULT OVER THE AGE OF 18 WITH YOU AT THE TIME OF THE PROCEDURE AND WITH YOU AT HOME AFTER THE PROCEDURE FOR 24 HOURS       Flakita Castañeda 1955 Diagnosis:     Surgical Physician: Dr. Jhonathan Leon have been scheduled for the procedure marked below:      Surgery: Right Hydrocelectomy w/ drain placement         Date: 10/26/2022     Anesthesia: Anesthesiologist (General/Spinal)     Place of Service: [address] [place] [address] Second Floor Same Day Surgery         Arrive to same day surgery by:  10:30am  (Surgery time is subject to change)      INSTRUCTIONS AS MARKED BELOW:    1.  DO NOT eat or drink anything after midnight before surgery. 2.  We prefer you shower or bathe with an antibacterial soap (Dial) the morning of surgery. 3  Please bring a current medication list, photo ID and insurance card(s) with you  4. Okay to take Tylenol  5. If you take Glucophage, Metformin or Janumet, hold 48-hours prior to surgery  6. Take blood pressure or heart medication as directed, if taken in the morning take with a small sip of water  7. The office will call you in 1-2 days after your procedure to schedule a follow up. DATE SENSITIVE TESTING-DO ON THE DATE LISTED*WALK IN *NO APPOINTMENT    DO URINE CULTURE, FASTING LABS AND CHEST XRAY ON 10/12/2022. ORDERS INCLUDED.         Date: 10/6/2022

## 2022-10-07 ENCOUNTER — PREP FOR PROCEDURE (OUTPATIENT)
Dept: UROLOGY | Age: 67
End: 2022-10-07

## 2022-10-12 ENCOUNTER — HOSPITAL ENCOUNTER (OUTPATIENT)
Dept: GENERAL RADIOLOGY | Age: 67
Discharge: HOME | End: 2022-10-12
Payer: MEDICAID

## 2022-10-12 ENCOUNTER — HOSPITAL ENCOUNTER (OUTPATIENT)
Age: 67
Discharge: HOME | End: 2022-10-12
Payer: MEDICAID

## 2022-10-12 DIAGNOSIS — Z01.818 PRE-OP TESTING: ICD-10-CM

## 2022-10-12 DIAGNOSIS — N43.2 OTHER HYDROCELE: ICD-10-CM

## 2022-10-12 LAB
ANION GAP SERPL CALCULATED.3IONS-SCNC: 11 MEQ/L (ref 8–16)
BASOPHILS # BLD: 1.1 %
BASOPHILS ABSOLUTE: 0.1 THOU/MM3 (ref 0–0.1)
BUN BLDV-MCNC: 19 MG/DL (ref 7–22)
CALCIUM SERPL-MCNC: 10.3 MG/DL (ref 8.5–10.5)
CHLORIDE BLD-SCNC: 105 MEQ/L (ref 98–111)
CO2: 27 MEQ/L (ref 23–33)
CREAT SERPL-MCNC: 0.8 MG/DL (ref 0.4–1.2)
EOSINOPHIL # BLD: 0.5 %
EOSINOPHILS ABSOLUTE: 0.1 THOU/MM3 (ref 0–0.4)
ERYTHROCYTE [DISTWIDTH] IN BLOOD BY AUTOMATED COUNT: 12.7 % (ref 11.5–14.5)
ERYTHROCYTE [DISTWIDTH] IN BLOOD BY AUTOMATED COUNT: 44.4 FL (ref 35–45)
GFR SERPL CREATININE-BSD FRML MDRD: > 90 ML/MIN/1.73M2
GLUCOSE BLD-MCNC: 94 MG/DL (ref 70–108)
HCT VFR BLD CALC: 44.7 % (ref 42–52)
HEMOGLOBIN: 14.9 GM/DL (ref 14–18)
IMMATURE GRANS (ABS): 0.08 THOU/MM3 (ref 0–0.07)
IMMATURE GRANULOCYTES: 0.7 %
LYMPHOCYTES # BLD: 15.4 %
LYMPHOCYTES ABSOLUTE: 1.6 THOU/MM3 (ref 1–4.8)
MCH RBC QN AUTO: 31.7 PG (ref 26–33)
MCHC RBC AUTO-ENTMCNC: 33.3 GM/DL (ref 32.2–35.5)
MCV RBC AUTO: 95.1 FL (ref 80–94)
MONOCYTES # BLD: 12.3 %
MONOCYTES ABSOLUTE: 1.3 THOU/MM3 (ref 0.4–1.3)
NUCLEATED RED BLOOD CELLS: 0 /100 WBC
PLATELET # BLD: 306 THOU/MM3 (ref 130–400)
PMV BLD AUTO: 11.4 FL (ref 9.4–12.4)
POTASSIUM SERPL-SCNC: 4.5 MEQ/L (ref 3.5–5.2)
RBC # BLD: 4.7 MILL/MM3 (ref 4.7–6.1)
SEG NEUTROPHILS: 70 %
SEGMENTED NEUTROPHILS ABSOLUTE COUNT: 7.5 THOU/MM3 (ref 1.8–7.7)
SODIUM BLD-SCNC: 143 MEQ/L (ref 135–145)
WBC # BLD: 10.7 THOU/MM3 (ref 4.8–10.8)

## 2022-10-12 PROCEDURE — 85025 COMPLETE CBC W/AUTO DIFF WBC: CPT

## 2022-10-12 PROCEDURE — 71046 X-RAY EXAM CHEST 2 VIEWS: CPT

## 2022-10-12 PROCEDURE — 36415 COLL VENOUS BLD VENIPUNCTURE: CPT

## 2022-10-12 PROCEDURE — 80048 BASIC METABOLIC PNL TOTAL CA: CPT

## 2022-10-12 PROCEDURE — 87086 URINE CULTURE/COLONY COUNT: CPT

## 2022-10-12 RX ORDER — SODIUM CHLORIDE 9 MG/ML
INJECTION, SOLUTION INTRAVENOUS CONTINUOUS
Status: CANCELLED | OUTPATIENT
Start: 2022-10-12

## 2022-10-14 LAB
ORGANISM: ABNORMAL
URINE CULTURE, ROUTINE: ABNORMAL

## 2022-10-17 ENCOUNTER — TELEPHONE (OUTPATIENT)
Dept: UROLOGY | Age: 67
End: 2022-10-17

## 2022-10-17 NOTE — TELEPHONE ENCOUNTER
Please review urine culture on 10/12/2022. Surgery with DR JEONG on 10/26/2022 for a RIGHT HYDROCELECTOMY. Thanks.

## 2022-10-19 NOTE — FLOWSHEET NOTE
Follow all instructions given by your physician    NPO after midnight   Sips of water am of surgery with allowed medications  Bring insurance info and 's license  Wear comfortable clean, loose fitting clothing  No jewelry or contact lenses to be worn day of surgery  No glue on dentures morning of surgery;you will be asked to remove them for surgery. Case for glasses. Shower night before and morning of surgery with a liquid antibacterial soap, dry with fresh clean towel; no lotions, creams or powder. Clean sheets and pillow case on bed night before surgery  Bring medications in original bottles   needed at discharge and someone over 18 to stay with you for 24 hours overnight (surgery may be cancelled if you don't have this)  Report to hospitals on 2nd floor  If you would become ill prior to surgery, please call the surgeon  May have a visitor with you, we request that you limit to 2 visitors in pre-op area  Call [phone] for any questions  Covid questionnaire Complete; Patient negative for symptoms or exposure. See documentation.

## 2022-10-24 NOTE — PROGRESS NOTES
Sent EKG dated 6-27-22 to anesthesia for approval to proceed without clearance. Dr Ivanna Jane is ok with proceeding without clearance.

## 2022-10-26 ENCOUNTER — ANESTHESIA EVENT (OUTPATIENT)
Dept: OPERATING ROOM | Age: 67
End: 2022-10-26
Payer: MEDICAID

## 2022-10-26 ENCOUNTER — HOSPITAL ENCOUNTER (OUTPATIENT)
Age: 67
Setting detail: OUTPATIENT SURGERY
Discharge: HOME | End: 2022-10-26
Attending: UROLOGY | Admitting: UROLOGY
Payer: MEDICAID

## 2022-10-26 ENCOUNTER — ANESTHESIA (OUTPATIENT)
Dept: OPERATING ROOM | Age: 67
End: 2022-10-26
Payer: MEDICAID

## 2022-10-26 VITALS
OXYGEN SATURATION: 95 % | WEIGHT: 154 LBS | TEMPERATURE: 96.2 F | HEIGHT: 76 IN | HEART RATE: 87 BPM | DIASTOLIC BLOOD PRESSURE: 86 MMHG | BODY MASS INDEX: 18.75 KG/M2 | RESPIRATION RATE: 16 BRPM | SYSTOLIC BLOOD PRESSURE: 157 MMHG

## 2022-10-26 DIAGNOSIS — N43.3 RIGHT HYDROCELE: ICD-10-CM

## 2022-10-26 LAB — POTASSIUM SERPL-SCNC: 4.3 MEQ/L (ref 3.5–5.2)

## 2022-10-26 PROCEDURE — 88305 TISSUE EXAM BY PATHOLOGIST: CPT

## 2022-10-26 PROCEDURE — 2500000003 HC RX 250 WO HCPCS: Performed by: REGISTERED NURSE

## 2022-10-26 PROCEDURE — 2580000003 HC RX 258: Performed by: UROLOGY

## 2022-10-26 PROCEDURE — 7100000001 HC PACU RECOVERY - ADDTL 15 MIN: Performed by: UROLOGY

## 2022-10-26 PROCEDURE — 2709999900 HC NON-CHARGEABLE SUPPLY: Performed by: UROLOGY

## 2022-10-26 PROCEDURE — 6360000002 HC RX W HCPCS: Performed by: REGISTERED NURSE

## 2022-10-26 PROCEDURE — 3700000000 HC ANESTHESIA ATTENDED CARE: Performed by: UROLOGY

## 2022-10-26 PROCEDURE — 7100000011 HC PHASE II RECOVERY - ADDTL 15 MIN: Performed by: UROLOGY

## 2022-10-26 PROCEDURE — 6360000002 HC RX W HCPCS: Performed by: UROLOGY

## 2022-10-26 PROCEDURE — 3600000012 HC SURGERY LEVEL 2 ADDTL 15MIN: Performed by: UROLOGY

## 2022-10-26 PROCEDURE — 3700000001 HC ADD 15 MINUTES (ANESTHESIA): Performed by: UROLOGY

## 2022-10-26 PROCEDURE — 36415 COLL VENOUS BLD VENIPUNCTURE: CPT

## 2022-10-26 PROCEDURE — 3600000002 HC SURGERY LEVEL 2 BASE: Performed by: UROLOGY

## 2022-10-26 PROCEDURE — 7100000010 HC PHASE II RECOVERY - FIRST 15 MIN: Performed by: UROLOGY

## 2022-10-26 PROCEDURE — 84132 ASSAY OF SERUM POTASSIUM: CPT

## 2022-10-26 PROCEDURE — 7100000000 HC PACU RECOVERY - FIRST 15 MIN: Performed by: UROLOGY

## 2022-10-26 RX ORDER — DOXYCYCLINE HYCLATE 100 MG
100 TABLET ORAL 2 TIMES DAILY
Qty: 10 TABLET | Refills: 0 | Status: SHIPPED | OUTPATIENT
Start: 2022-10-26 | End: 2022-10-31

## 2022-10-26 RX ORDER — FENTANYL CITRATE 50 UG/ML
INJECTION, SOLUTION INTRAMUSCULAR; INTRAVENOUS PRN
Status: DISCONTINUED | OUTPATIENT
Start: 2022-10-26 | End: 2022-10-26 | Stop reason: SDUPTHER

## 2022-10-26 RX ORDER — EPHEDRINE SULFATE 50 MG/ML
INJECTION INTRAVENOUS PRN
Status: DISCONTINUED | OUTPATIENT
Start: 2022-10-26 | End: 2022-10-26 | Stop reason: SDUPTHER

## 2022-10-26 RX ORDER — PROPOFOL 10 MG/ML
INJECTION, EMULSION INTRAVENOUS PRN
Status: DISCONTINUED | OUTPATIENT
Start: 2022-10-26 | End: 2022-10-26 | Stop reason: SDUPTHER

## 2022-10-26 RX ORDER — DEXAMETHASONE SODIUM PHOSPHATE 10 MG/ML
INJECTION, EMULSION INTRAMUSCULAR; INTRAVENOUS PRN
Status: DISCONTINUED | OUTPATIENT
Start: 2022-10-26 | End: 2022-10-26 | Stop reason: SDUPTHER

## 2022-10-26 RX ORDER — SODIUM CHLORIDE 9 MG/ML
INJECTION, SOLUTION INTRAVENOUS CONTINUOUS
Status: DISCONTINUED | OUTPATIENT
Start: 2022-10-26 | End: 2022-10-26 | Stop reason: HOSPADM

## 2022-10-26 RX ORDER — HYDROCODONE BITARTRATE AND ACETAMINOPHEN 5; 325 MG/1; MG/1
1 TABLET ORAL EVERY 6 HOURS PRN
Qty: 15 TABLET | Refills: 0 | Status: SHIPPED | OUTPATIENT
Start: 2022-10-26 | End: 2022-10-31

## 2022-10-26 RX ORDER — ONDANSETRON 2 MG/ML
INJECTION INTRAMUSCULAR; INTRAVENOUS PRN
Status: DISCONTINUED | OUTPATIENT
Start: 2022-10-26 | End: 2022-10-26 | Stop reason: SDUPTHER

## 2022-10-26 RX ORDER — GLYCOPYRROLATE 1 MG/5 ML
SYRINGE (ML) INTRAVENOUS PRN
Status: DISCONTINUED | OUTPATIENT
Start: 2022-10-26 | End: 2022-10-26 | Stop reason: SDUPTHER

## 2022-10-26 RX ADMIN — CEFAZOLIN 2000 MG: 10 INJECTION, POWDER, FOR SOLUTION INTRAVENOUS at 12:43

## 2022-10-26 RX ADMIN — PHENYLEPHRINE HYDROCHLORIDE 100 MCG: 10 INJECTION INTRAVENOUS at 13:04

## 2022-10-26 RX ADMIN — SODIUM CHLORIDE: 9 INJECTION, SOLUTION INTRAVENOUS at 10:35

## 2022-10-26 RX ADMIN — FENTANYL CITRATE 50 MCG: 50 INJECTION, SOLUTION INTRAMUSCULAR; INTRAVENOUS at 12:55

## 2022-10-26 RX ADMIN — DEXAMETHASONE SODIUM PHOSPHATE 10 MG: 10 INJECTION, EMULSION INTRAMUSCULAR; INTRAVENOUS at 12:39

## 2022-10-26 RX ADMIN — EPHEDRINE SULFATE 25 MG: 50 INJECTION, SOLUTION INTRAVENOUS at 13:04

## 2022-10-26 RX ADMIN — FENTANYL CITRATE 50 MCG: 50 INJECTION, SOLUTION INTRAMUSCULAR; INTRAVENOUS at 12:39

## 2022-10-26 RX ADMIN — FENTANYL CITRATE 50 MCG: 50 INJECTION, SOLUTION INTRAMUSCULAR; INTRAVENOUS at 13:13

## 2022-10-26 RX ADMIN — FENTANYL CITRATE 50 MCG: 50 INJECTION, SOLUTION INTRAMUSCULAR; INTRAVENOUS at 12:53

## 2022-10-26 RX ADMIN — PROPOFOL 200 MG: 10 INJECTION, EMULSION INTRAVENOUS at 12:39

## 2022-10-26 RX ADMIN — EPHEDRINE SULFATE 25 MG: 50 INJECTION, SOLUTION INTRAVENOUS at 13:02

## 2022-10-26 RX ADMIN — Medication 0.2 MG: at 13:00

## 2022-10-26 RX ADMIN — ONDANSETRON 4 MG: 2 INJECTION INTRAMUSCULAR; INTRAVENOUS at 12:39

## 2022-10-26 ASSESSMENT — PAIN - FUNCTIONAL ASSESSMENT: PAIN_FUNCTIONAL_ASSESSMENT: 0-10

## 2022-10-26 ASSESSMENT — PAIN SCALES - GENERAL
PAINLEVEL_OUTOF10: 0
PAINLEVEL_OUTOF10: 2
PAINLEVEL_OUTOF10: 0
PAINLEVEL_OUTOF10: 0

## 2022-10-26 NOTE — ANESTHESIA PRE PROCEDURE
Department of Anesthesiology  Preprocedure Note       Name:  Oswald Flynn   Age:  61 y.o.  :  1955                                          MRN:  [id number]         Date:  10/26/2022      Surgeon: Seferino Goodman):  Lori Holden MD    Procedure: Procedure(s):  RIGHT HYDROCELECTOMY    Medications prior to admission:   Prior to Admission medications    Medication Sig Start Date End Date Taking?  Authorizing Provider   lisinopril (PRINIVIL;ZESTRIL) 5 MG tablet Take 1 tablet by mouth daily 22   Cici Bhatti, DO   amLODIPine (NORVASC) 10 MG tablet Take 1 tablet by mouth daily 22   Cici Walden, DO   MULTIPLE VITAMIN PO Take by mouth daily    Historical Provider, MD       Current medications:    Current Facility-Administered Medications   Medication Dose Route Frequency Provider Last Rate Last Admin    0.9 % sodium chloride infusion   IntraVENous Continuous Lori Holden MD        ceFAZolin (ANCEF) 2000 mg in dextrose 5 % 50 mL IVPB  2,000 mg IntraVENous 30 Min Pre-Op Lori Holden MD           Allergies:  No Known Allergies    Problem List:    Patient Active Problem List   Diagnosis Code    HTN (hypertension) I10    Tobacco abuse Z72.0    Alcohol abuse F10.10    Hypertension I10       Past Medical History:        Diagnosis Date    Hydrocele     right    Hypertension        Past Surgical History:        Procedure Laterality Date    DENTAL SURGERY      dental       Social History:    Social History     Tobacco Use    Smoking status: Every Day     Packs/day: 1.00     Years: 52.00     Pack years: 52.00     Types: Cigarettes     Start date:     Smokeless tobacco: Never   Substance Use Topics    Alcohol use: Not Currently     Comment: daily--hasn't drank since                                 Ready to quit: Not Answered  Counseling given: Not Answered      Vital Signs (Current):   Vitals:    10/19/22 1534 10/26/22 0959   BP:  (!) 149/96   Pulse:  81   Resp:  16   Temp:  97.5 °F (36.4 °C) TempSrc:  Temporal   SpO2:  98%   Weight: 160 lb (72.6 kg) 154 lb (69.9 kg)   Height: 6' 3\" (1.905 m) 6' 4\" (1.93 m)                                              BP Readings from Last 3 Encounters:   10/26/22 (!) 149/96   09/28/22 132/82   09/27/22 (!) 152/94       NPO Status:                                                                                 BMI:   Wt Readings from Last 3 Encounters:   10/26/22 154 lb (69.9 kg)   09/28/22 153 lb (69.4 kg)   09/27/22 153 lb 6.4 oz (69.6 kg)     Body mass index is 18.75 kg/m². CBC:   Lab Results   Component Value Date/Time    WBC 10.7 10/12/2022 10:36 AM    RBC 4.70 10/12/2022 10:36 AM    HGB 14.9 10/12/2022 10:36 AM    HCT 44.7 10/12/2022 10:36 AM    MCV 95.1 10/12/2022 10:36 AM     10/12/2022 10:36 AM       CMP:   Lab Results   Component Value Date/Time     10/12/2022 10:36 AM    K 4.5 10/12/2022 10:36 AM    K 4.1 06/27/2022 05:57 PM     10/12/2022 10:36 AM    CO2 27 10/12/2022 10:36 AM    BUN 19 10/12/2022 10:36 AM    CREATININE 0.8 10/12/2022 10:36 AM    LABGLOM >90 10/12/2022 10:36 AM    GLUCOSE 94 10/12/2022 10:36 AM    PROT 7.5 06/27/2022 05:57 PM    CALCIUM 10.3 10/12/2022 10:36 AM    BILITOT 0.3 06/27/2022 05:57 PM    ALKPHOS 145 06/27/2022 05:57 PM    AST 9 06/27/2022 05:57 PM    ALT 7 06/27/2022 05:57 PM       POC Tests: No results for input(s): POCGLU, POCNA, POCK, POCCL, POCBUN, POCHEMO, POCHCT in the last 72 hours.     Coags: No results found for: PROTIME, INR, APTT    HCG (If Applicable): No results found for: PREGTESTUR, PREGSERUM, HCG, HCGQUANT     ABGs: No results found for: PHART, PO2ART, AND6OBB, EWR2GDJ, BEART, X4JAMJXO     Type & Screen (If Applicable):  No results found for: LABABO, LABRH    Drug/Infectious Status (If Applicable):  No results found for: HIV, HEPCAB    COVID-19 Screening (If Applicable):   Lab Results   Component Value Date/Time    COVID19 DETECTED 12/28/2021 09:50 AM           Anesthesia Evaluation  Patient summary reviewed and Nursing notes reviewed no history of anesthetic complications:   Airway: Mallampati: II          Dental:          Pulmonary: breath sounds clear to auscultation                             Cardiovascular:  Exercise tolerance: good (>4 METS),   (+) hypertension:,         Rhythm: regular  Rate: normal                    Neuro/Psych:               GI/Hepatic/Renal:             Endo/Other:                     Abdominal:             Vascular: Other Findings:           Anesthesia Plan      general     ASA 2       Induction: intravenous. MIPS: Postoperative opioids intended and Prophylactic antiemetics administered. Anesthetic plan and risks discussed with patient and mother. Plan discussed with CRNA.                     [address] [place] [address],    10/26/2022

## 2022-10-26 NOTE — H&P
Dr. Horacio Molina MD  [facility])  Urology Clinic  New Patient Visit      Patient:  Wagner Velazquez  YOB: 1955  Date: 10/26/2022    HISTORY OF PRESENT ILLNESS:   The patient is a 61 y.o. male who presents today for evaluation of the following problem(s): right hydrocele. Overall the problem(s) : are worsening. Associated Symptoms: No dysuria, gross hematuria. Pain Severity: 0/10    Summary of old records:   None    Imaging/Labs reviewed during today's visit:  I have independently reviewed and verified the following films during today's visit. None    Last several PSA's:  No results found for: PSA    Last total testosterone:  No results found for: TESTOSTERONE    Urinalysis today:  No results found for this visit on 09/28/22. Last BUN and creatinine:  Lab Results   Component Value Date    BUN 19 10/12/2022     Lab Results   Component Value Date    CREATININE 0.8 10/12/2022       Imaging Reviewed during this Office Visit:   (results were independently reviewed by physician and radiology report verified)    PAST MEDICAL, FAMILY AND SOCIAL HISTORY:  Past Medical History:   Diagnosis Date    Hydrocele     right    Hypertension      Past Surgical History:   Procedure Laterality Date    DENTAL SURGERY  2013    dental     Family History   Problem Relation Age of Onset    Stroke Mother         200    Other Mother         migraines    Other Father         pulmonary fibrosisi     No outpatient medications have been marked as taking for the 10/26/22 encounter [facility] Encounter). Patient has no known allergies.   Social History     Tobacco Use   Smoking Status Every Day    Packs/day: 1.00    Years: 52.00    Pack years: 52.00    Types: Cigarettes    Start date: 1974   Smokeless Tobacco Never       Social History     Substance and Sexual Activity   Alcohol Use Not Currently    Comment: daily--hasn't drank since christmas       REVIEW OF SYSTEMS:  Constitutional: negative  Eyes: negative  Respiratory: negative  Cardiovascular: negative  Gastrointestinal: negative  Musculoskeletal: negative  Genitourinary: negative except for what is in HPI  Skin: negative   Neurological: negative  Hematological/Lymphatic: negative  Psychological: negative    Physical Exam:    This a 61 y.o. male   Vitals:    10/26/22 0959   BP: (!) 149/96   Pulse: 81   Resp: 16   Temp: 97.5 °F (36.4 °C)   SpO2: 98%     Constitutional: Patient in no acute distress; Neuro: alert and oriented to person place and time. Psych: Mood and affect normal.  Skin: Normal  Lungs: Respiratory effort normal  Cardiovascular:  Normal peripheral pulses  Abdomen: Soft, non-tender, non-distended with no CVA, flank pain, hepatosplenomegaly or hernia. Kidneys normal.  Bladder non-tender and not distended. Lymphatics: no palpable lymphadenopathy  Penis normal and circumcised  Urethral meatus normal  Scrotal exam normal  Testicles  showed large right hydrocele. Assessment and Plan      Right hydrocele         Plan:      No follow-ups on file. Will proceed with right hydrocele repair with drain placement.           Dr. Mark Cabral MD

## 2022-10-26 NOTE — ANESTHESIA POSTPROCEDURE EVALUATION
Department of Anesthesiology  Postprocedure Note    Patient: Amelia Suarez  MRN: [id number]  YOB: 1955  Date of evaluation: 10/26/2022      Procedure Summary     Date: 10/26/22 Room / Location: [address] [facility] [place]    Anesthesia Start: 2432 Anesthesia Stop: 8328    Procedure: RIGHT HYDROCELECTOMY (Right) Diagnosis:       Right hydrocele      (Right hydrocele [N43.3])    Surgeons: Sarah Peña MD Responsible Provider: Becky Vaughn DO    Anesthesia Type: general ASA Status: 2          Anesthesia Type: No value filed.     Yasir Phase I: Yasir Score: 9    Yasir Phase II: Yasir Score: 10      Anesthesia Post Evaluation    Patient location during evaluation: PACU  Patient participation: complete - patient participated  Level of consciousness: awake  Airway patency: patent  Nausea & Vomiting: no nausea  Complications: no  Cardiovascular status: hemodynamically stable  Respiratory status: acceptable  Hydration status: stable

## 2022-10-26 NOTE — PROGRESS NOTES
1337- pt to pacu, VSS, nasal airway present, pt appears in no acute distress  1343- nasal airway removed, VSS, pt responds to voice, resp easy and unlabored, pt denies pain, pt appears in no acute distress  1400- pt states he feels better now than before surgery, pt resting in bed with eyes opened, resp easy and unlabored, VSS, pt appears in no acute distress  1407- pt meets criteria for discharge from pacu, pt transported to [facility], report given to Kaitlynn Mckeon

## 2022-10-26 NOTE — ANESTHESIA PRE PROCEDURE
Department of Anesthesiology  Preprocedure Note       Name:  Micah Dozier   Age:  61 y.o.  :  1955                                          MRN:  [id number]         Date:  10/26/2022      Surgeon: Gaby Monte):  Buzz Layton MD    Procedure: Procedure(s):  RIGHT HYDROCELECTOMY    Medications prior to admission:   Prior to Admission medications    Medication Sig Start Date End Date Taking?  Authorizing Provider   lisinopril (PRINIVIL;ZESTRIL) 5 MG tablet Take 1 tablet by mouth daily 22   Cici Maxort, DO   amLODIPine (NORVASC) 10 MG tablet Take 1 tablet by mouth daily 22   Cici LEE Yuhas, DO   MULTIPLE VITAMIN PO Take by mouth daily    Historical Provider, MD       Current medications:    Current Facility-Administered Medications   Medication Dose Route Frequency Provider Last Rate Last Admin    0.9 % sodium chloride infusion   IntraVENous Continuous Buzz Layton  mL/hr at 10/26/22 1035 Restarted at 10/26/22 1236     Facility-Administered Medications Ordered in Other Encounters   Medication Dose Route Frequency Provider Last Rate Last Admin    fentaNYL (SUBLIMAZE) injection   IntraVENous PRN Rudolfo Knack, APRN - CRNA   50 mcg at 10/26/22 1313    ondansetron (ZOFRAN) injection   IntraVENous PRN Rudolfo Knack, APRN - CRNA   4 mg at 10/26/22 1239    dexamethasone (PF) (DECADRON) injection   IntraVENous PRN Rudolfo Knack, APRN - CRNA   10 mg at 10/26/22 1239    propofol injection   IntraVENous PRN Rudolfo Knack, APRN - CRNA   200 mg at 10/26/22 1239    glycopyrrolate (ROBINUL) injection   IntraVENous PRN Rudolfo Knack, APRN - CRNA   0.2 mg at 10/26/22 1300    ePHEDrine injection   IntraVENous PRN Rudolfo Knack, APRN - CRNA   25 mg at 10/26/22 1304    phenylephrine (JCARLOS-SYNEPHRINE) injection   IntraVENous PRN Rudolfo Knack, APRN - CRNA   100 mcg at 10/26/22 1304       Allergies:  No Known Allergies    Problem List:    Patient Active Problem List   Diagnosis Code    HTN (hypertension) I10    Tobacco abuse Z72.0    Alcohol abuse F10.10    Hypertension I10       Past Medical History:        Diagnosis Date    Hydrocele     right    Hypertension        Past Surgical History:        Procedure Laterality Date    DENTAL SURGERY  2013    dental       Social History:    Social History     Tobacco Use    Smoking status: Every Day     Packs/day: 1.00     Years: 52.00     Pack years: 52.00     Types: Cigarettes     Start date: 1974    Smokeless tobacco: Never   Substance Use Topics    Alcohol use: Not Currently     Comment: daily--hasn't drank since jonna                                Ready to quit: Not Answered  Counseling given: Not Answered      Vital Signs (Current):   Vitals:    10/19/22 1534 10/26/22 0959   BP:  (!) 149/96   Pulse:  81   Resp:  16   Temp:  36.4 °C (97.5 °F)   TempSrc:  Temporal   SpO2:  98%   Weight: 160 lb (72.6 kg) 154 lb (69.9 kg)   Height: 6' 3\" (1.905 m) 6' 4\" (1.93 m)                                              BP Readings from Last 3 Encounters:   10/26/22 (!) 149/96   09/28/22 132/82   09/27/22 (!) 152/94       NPO Status: Time of last liquid consumption: 1900                        Time of last solid consumption: 1900                        Date of last liquid consumption: 10/25/22                        Date of last solid food consumption: 10/25/22    BMI:   Wt Readings from Last 3 Encounters:   10/26/22 154 lb (69.9 kg)   09/28/22 153 lb (69.4 kg)   09/27/22 153 lb 6.4 oz (69.6 kg)     Body mass index is 18.75 kg/m².     CBC:   Lab Results   Component Value Date/Time    WBC 10.7 10/12/2022 10:36 AM    RBC 4.70 10/12/2022 10:36 AM    HGB 14.9 10/12/2022 10:36 AM    HCT 44.7 10/12/2022 10:36 AM    MCV 95.1 10/12/2022 10:36 AM     10/12/2022 10:36 AM       CMP:   Lab Results   Component Value Date/Time     10/12/2022 10:36 AM    K 4.3 10/26/2022 10:00 AM    K 4.1 06/27/2022 05:57 PM     10/12/2022 10:36 AM    CO2 27 10/12/2022 10:36 AM    BUN 19 10/12/2022 10:36 AM    CREATININE 0.8 10/12/2022 10:36 AM    LABGLOM >90 10/12/2022 10:36 AM    GLUCOSE 94 10/12/2022 10:36 AM    PROT 7.5 06/27/2022 05:57 PM    CALCIUM 10.3 10/12/2022 10:36 AM    BILITOT 0.3 06/27/2022 05:57 PM    ALKPHOS 145 06/27/2022 05:57 PM    AST 9 06/27/2022 05:57 PM    ALT 7 06/27/2022 05:57 PM       POC Tests: No results for input(s): POCGLU, POCNA, POCK, POCCL, POCBUN, POCHEMO, POCHCT in the last 72 hours.     Coags: No results found for: PROTIME, INR, APTT    HCG (If Applicable): No results found for: PREGTESTUR, PREGSERUM, HCG, HCGQUANT     ABGs: No results found for: PHART, PO2ART, AFQ6EIE, LDK4FRH, BEART, I1ZKQICH     Type & Screen (If Applicable):  No results found for: LABABO, LABRH    Drug/Infectious Status (If Applicable):  No results found for: HIV, HEPCAB    COVID-19 Screening (If Applicable):   Lab Results   Component Value Date/Time    COVID19 DETECTED 12/28/2021 09:50 AM           Anesthesia Evaluation     Anesthesia Plan        Opal Oquendo, APRN - CRNA   10/26/2022

## 2022-10-26 NOTE — PROGRESS NOTES
Pt returned to [facility] room 5. Vitals and assessment as charted. 0.9 infusing, @300ml to count from PACU. Pt has muffin and Pepsi. Family at the bedside. Pt and family verbalized understanding of discharge criteria and call light use. Call light in reach.

## 2022-10-26 NOTE — OP NOTE
Dr. Cristel Spangler MD  Urologic Surgery      [address] [place] [address]. [place]  10/26/22    Patient:  Pepper Warner  MRN: [id number]  YOB: 1955    Surgeon: Dr. Cristel Spangler MD  Assistant: None    Pre-op Diagnosis: Right Hydrocele  Post-op Diagnosis: Same    Procedure:   Right Hydrocelectomy    Anesthesia: General  Complications: None  OR Blood Loss: Minimal  Fluids: Cystalloids  Specimens:  ID Type Source Tests Collected by Time Destination   A : Testicular Tissue Tissue Testis SURGICAL PATHOLOGY Cristel Spangler MD 10/26/2022 1340        Indication:  Very pleasant gentleman with a large right hydrocele roughly 1 L. We discussed treatment options including observation and hydrocelectomy. After risks and benefits were explained he elected to proceed with today's procedure. Narrative of the Procedure:    After informed consent was obtained in the preoperative area, the patient was taken back to the operating room and transferred to the operating table in supine position. EPC cuffs were placed. The machine was turned on. Anesthesia was induced and antibiotics were given. A timeout occurred. Two patient identifiers were used. A transverse scrotal incision was made over the right hemiscrotum. The dartos was divided and the tunica vaginalis was cleaned off. The testis was delivered. The vaginalis was then opened and the hydrocele was evacuated. Hydrocele appeared to be old blood mixed with hydrocele fluid as it was brown. All this was washed out. The testis was inspected. Excess tunica vaginalis was excised. The vaginalis was then everted with  2-0 Vicryl. Exquisite hemostasis was achieved globally. The lateral sulcus was located and the testicle was placed back in the scrotum in proper position. The dartos layer was closed with  2-0 Vicryl suture. The skin was closed with  2-0 Vicryl. Bacitracin ointment was placed on the wound. . All needle counts were correct.  The patient was then awakened and discharged back to the PACU in good and stable condition. Follow-Up: Follow-up in 1 week for drain removal with nurse practitioner or MA in the office. See nurse practitioner back in the office for routine postop visit in 4 weeks.     Pham Stevenson MD  Electronically signed on 10/26/2022 at 1:52 PM

## 2022-10-26 NOTE — DISCHARGE INSTRUCTIONS
Jaydon,    Your procedure went well. Your right hydrocele was addressed today. Postoperatively you can expect quite a bit of swelling to the scrotum. While today things look relatively normal, tomorrow things can look much worse simply from the swelling. I want you to ice the area frequently over the next 2 weeks. Bacitracin ointment twice daily for 1 week. Scrotal support and elevation to reduce swelling. We will see you back in 1 week for the drain removal.  My office will contact you for this. Please resume all home medications. Regular diet. No lifting more than 20 pounds until follow-up.     Best,  Dr. Cristel Spangler MD

## 2022-11-02 ENCOUNTER — NURSE ONLY (OUTPATIENT)
Dept: UROLOGY | Age: 67
End: 2022-11-02

## 2022-11-02 PROCEDURE — 99999 PR OFFICE/OUTPT VISIT,PROCEDURE ONLY: CPT | Performed by: UROLOGY

## 2022-11-02 NOTE — PROGRESS NOTES
Area cleaned, stitched removed and penrose drain removed. Patient tolerated well. No signs or symptoms of infection. Patient was given sterile gauze to apply to area.

## 2022-11-21 ENCOUNTER — OFFICE VISIT (OUTPATIENT)
Dept: UROLOGY | Age: 67
End: 2022-11-21

## 2022-11-21 VITALS — HEIGHT: 76 IN | WEIGHT: 145.5 LBS | BODY MASS INDEX: 17.72 KG/M2

## 2022-11-21 DIAGNOSIS — N43.3 RIGHT HYDROCELE: ICD-10-CM

## 2022-11-21 DIAGNOSIS — Z48.816 SURGICAL AFTERCARE, GENITOURINARY SYSTEM: Primary | ICD-10-CM

## 2022-11-21 PROCEDURE — 99024 POSTOP FOLLOW-UP VISIT: CPT

## 2022-11-21 NOTE — PROGRESS NOTES
JESSICA Brown is a 61 y.o. male that presents to the urology clinic for surgical follow-up. Patient underwent right hydrocelectomy with Dr. Parish on 10/26/22. Impressive size ~ football prior to surgery. Still some swelling, but patient notes improvement. Following drain removal he does not a small spike in swelling, but this slowly resolved. Patient is having minimal pain/discomfort- 1/10. Denies fever or chills. Keeping the site clean. Was applying triple antibiotic cream and gauze while incision site was open, but since closing he has discontinued this. Admits to urinary urgency, which was present prior to surgery. Able to void adequately at home, denies small/frequent voids. Denies feeling of incomplete bladder emptying. Pain Scale 1      Last BUN and creatinine:  Lab Results   Component Value Date    BUN 19 10/12/2022     Lab Results   Component Value Date    CREATININE 0.8 10/12/2022       PAST MEDICAL, FAMILY AND SOCIAL HISTORY UPDATE:  Past Medical History:   Diagnosis Date    Hydrocele     right    Hypertension      Past Surgical History:   Procedure Laterality Date    DENTAL SURGERY  2013    dental    HYDROCELE EXCISION Right 10/26/2022    RIGHT HYDROCELECTOMY performed by Fadi Solano MD at [address] [place] [address] History   Problem Relation Age of Onset    Stroke Mother         200    Other Mother         migraines    Other Father         pulmonary fibrosisi     No outpatient medications have been marked as taking for the 11/21/22 encounter (Office Visit) with Jim Handley PA-C. Patient has no known allergies.   Social History     Tobacco Use   Smoking Status Every Day    Packs/day: 1.00    Years: 52.00    Pack years: 52.00    Types: Cigarettes    Start date: 1974   Smokeless Tobacco Never       Social History     Substance and Sexual Activity   Alcohol Use Not Currently    Comment: daily--hasn't drank since jonna       REVIEW OF SYSTEMS:  Constitutional: negative  Eyes: negative  Respiratory: negative  Cardiovascular: negative  Gastrointestinal: negative  Musculoskeletal: negative  Genitourinary: negative except for what is in HPI  Skin: negative   Neurological: negative  Hematological/Lymphatic: negative  Psychological: negative    Physical Exam:    There were no vitals filed for this visit. Patient is a 61 y.o. male in no acute distress and alert and oriented to person, place and time. Pulmonary- Non labored respiration. Cardiovascular- Normal rate, regular rhythm, normal peripheral pulses. Skin- Warm and dry. Psych- Normal mood and affect. - Incision site closed on scrotum, sutures dissolved. No drainage or erythema. Scrotum tight and firm on right side, approximately the size of an oblong tennis ball. Assessment and Plan   S/P Hydrocelectomy  - Healing appropriately. Incision site closed and no signs of infection.  - Swelling still present. Improved following surgery and has remained the same over the last 1-2 weeks. Denies worsening.   - Discussed patient with Dr. Jas Escamilla. Given persistent swelling, would like to see him in 4 weeks. - Continue scrotal support. - Instructed to call the office if swelling continues to worsen, occurrence of fever/chills, or in the setting of worsening urinary symptoms.   - Follow-up with Dr. Jas Escamilla in the office in 4 weeks.        Radha Campbell PA-C  Urology

## 2022-12-21 ENCOUNTER — OFFICE VISIT (OUTPATIENT)
Dept: UROLOGY | Age: 67
End: 2022-12-21

## 2022-12-21 DIAGNOSIS — N50.89 SCROTAL SWELLING: ICD-10-CM

## 2022-12-21 DIAGNOSIS — N43.3 RIGHT HYDROCELE: Primary | ICD-10-CM

## 2022-12-21 PROCEDURE — 99024 POSTOP FOLLOW-UP VISIT: CPT

## 2022-12-21 RX ORDER — DOXYCYCLINE HYCLATE 100 MG
100 TABLET ORAL 2 TIMES DAILY
Qty: 28 TABLET | Refills: 0 | Status: SHIPPED | OUTPATIENT
Start: 2022-12-21 | End: 2023-01-04

## 2022-12-21 NOTE — PROGRESS NOTES
[address] [address] [place] [address]  Dept: [phone]  Loc: [phone]  Visit Date: 12/21/2022    JESSICA Webber is a 61 y.o. male that presents to the urology clinic for post-operative follow-up. Patient underwent right hydrocelectomy with Dr. Susanne Clinton on 10/26/22 for large (~1 L) right hydrocele. Today, patient denies having any pain related to the hydrocelectomy. Swelling has not gone down but patient states he does not feel like the swelling has increased. No longer applying triple antibiotic ointment or dressing. Continues to wear scrotal support. Patient denies fever or chills. No nausea or vomiting. No chest pain or shortness of breath. Pain Scale 0/10      Last BUN and creatinine:  Lab Results   Component Value Date    BUN 19 10/12/2022     Lab Results   Component Value Date    CREATININE 0.8 10/12/2022         PAST MEDICAL, FAMILY AND SOCIAL HISTORY UPDATE:  Past Medical History:   Diagnosis Date    Hydrocele     right    Hypertension      Past Surgical History:   Procedure Laterality Date    DENTAL SURGERY  2013    dental    HYDROCELE EXCISION Right 10/26/2022    RIGHT HYDROCELECTOMY performed by Maranda Ascencio [facility] at [facility] History   Problem Relation Age of Onset    Stroke Mother         200    Other Mother         migraines    Other Father         pulmonary fibrosisi     No outpatient medications have been marked as taking for the 12/21/22 encounter (Appointment) with Heena You PA-C. Patient has no known allergies.   Social History     Tobacco Use   Smoking Status Every Day    Packs/day: 1.00    Years: 52.00    Pack years: 52.00    Types: Cigarettes    Start date: 1974   Smokeless Tobacco Never       Social History     Substance and Sexual Activity   Alcohol Use Not Currently    Comment: daily--hasn't drank since christmas       REVIEW OF SYSTEMS:  Constitutional: negative  Eyes: negative  Respiratory: negative  Cardiovascular: negative  Gastrointestinal: negative  Musculoskeletal: negative  Genitourinary: negative except for what is in HPI  Skin: negative   Neurological: negative  Hematological/Lymphatic: negative  Psychological: negative    Physical Exam:    There were no vitals filed for this visit. Patient is a 61 y.o. male in no acute distress and alert and oriented to person, place and time. Pulmonary: Non-labored respiration. Cardiovascular: Normal rate, regular rhythm, normal peripheral pulses. Skin: Warm and dry. Psych: Normal mood and affect. Genitourinary: Scrotal swelling, greater on the right. Area of firmness along incision line. Erythema present. Non-tender. Wound approximated, but tight. No areas of dehiscence. Assessment and Plan   Right hydrocele  Scrotal swelling  - S/P right hydrocelectomy on 10/26/22 with Dr. Milka Lott (Patient ~8 weeks out). - Pain 0/10.  - Patient's swelling has not resolved. Comparable to last visit. Erythema and warmth. Discussed patient with Dr. Milka Lott. - Will treat with Doxycycline BID x 14 days. Sent to patient's pharmacy. - Follow-up in 1 month with Dr. Milka Lott to assess for resolution in swelling vs concern for recurrence. - Patient expresses understanding and is in agreement with treatment plan as discussed.        Doug Segura PA-C  Urology

## 2022-12-28 DIAGNOSIS — I10 PRIMARY HYPERTENSION: ICD-10-CM

## 2022-12-28 RX ORDER — LISINOPRIL 5 MG/1
5 TABLET ORAL DAILY
Qty: 30 TABLET | Refills: 12 | Status: SHIPPED | OUTPATIENT
Start: 2022-12-28

## 2022-12-28 NOTE — TELEPHONE ENCOUNTER
Patient called requesting refill on Lisinopril.     Last ordered by Dr. Wilton Reyes 9/27, disp #30, refill 2    Last seen 9/27 Dr. Nalini Hardy   Next appointment 1/10 Dr. Cuauhtemoc hauser

## 2023-01-09 NOTE — PROGRESS NOTES
[address] [facility] Internal Medicine  [facility] [address] [facility] Children[facility]ICHAR MAGUIRE II.HOMAR, [address]  Dept: [phone]  Dept Fax: [phone]    Hunter Gonzalez 1955 is a 74 y.o. male, Established patient, here for evaluations of:  Chief Complaint   Patient presents with    Hypertension          ASSESSMENT/PLAN:    1. Primary hypertension   -Well Controlled on today's visit  -Patient states good compliance of Amlodipine 10 mg and Lisinopril 10 mg daily  -Refilling Amlodipine at same dose  -Recheck BMP prior to next exam      2. Tobacco abuse   -Discussed methods to quit smoking including patch, gum, and generic Chantix  -Patient down to 0.5 packs a day  -Prefers to try and quit on his own, deferred assistance at this time  -Deferred Low Dose DT scan as below     3. Health maintenance examination   -Discussed Colonoscopy/Cologaurd and Low dose CT scan for health maintenance  -Patient deferred at this time, discussed risks and benefits  -Lipid Panel ordered for next visit  -Continue to discuss health maintenance tests on next visit     4. Chronic fatigue   -Stated after COVID infection, more prominent after retiring  -Reassurance offered  -Discussed PRN use of L-carnitine if having a particularly bad fatigue day     5. Chronic right hip pain   -Present for years, no Red Flag signs or changes to suggest infection or fracture  -Walking using a 4-point cane  -Requested Handicap Parking Letter due to relative immobility, Letter Printed and Signed       Return in about 6 months (around 7/10/2023). Diagnostic data:   I have reviewed recent diagnostic testing including labs with patient today. I have reviewed the notes from the prior visit and Urology. I have reviewed the labs for BMP, CBC, Surgical Pathology. SUBJECTIVE/OBJECTIVE:    Hunter Gonzalez is a 74 y.o. male who presents for a three month follow-up for HTN, Tobacco use, and Right hip pain. Primary HTN:  No history of heart attacks or strokes.   Mother had a major ischemic stroke in 2014. No history of AAA. No headaches, blurry vision, syncope, lightheadedness. Stated good tolerance of amlodipine 10 mg daily and Lisinopril 5 mg daily. Potassium 4.3 on 10/2022. Blood Pressure well controlled on today's exam.     Right Hip Pain/Right Knee pain:  Takes Ibuprofen outpatient. States occurring after a fall. States leg and toe drag on occasion. Currently using a cane today. Has fear of loosing balance. Requesting Handicap Letter due to difficulty walking long distances with pain. Able to walk up steps, but SLOWLY. Hydrocele s/p removal:  Located in Right testicle, referred to Urology due to size. Surgically removed by Urology in 10/2022. Pathology shows no tumor, features suggestive of hematocele. Area healing well. Tobacco Use:  Down to 0.5 packs per day. Still staying under a pack a day. Patient has biggest cravings in the morning, but has nearly stopped all smoking at night. Not interseted in gum or patch today. Offered generic Chantix, patient deferred at this time. Previous COVID infection, chronic fatigue:  Occurring in 2021. Very fatigued when he first caught COVID, but able to return to work. Has since retired and has stated chronic fatigue. Denies depression, no other signs or symptoms. Discussed L-Carnitine for post COVID use. Mild Leukocytosis, resolved:  WBC 10.7 on 10/2022. Patient is a chronic smoker, no signs of infection at this time. Continue to monitor yearly. Chronic ALP elevation:  No history of biliary disease or Abdominal pain. Likely related to hip pain.  on 6/2022. Continue to monitor at this time. HM:  Defferred Colonoscopy, Defferered Lung Screening. Explained the risks and benefits of both procedures, the patient would like to hold at this time due to personal preference.      ---------------------------------------------------------------------------------  Current Outpatient Medications Medication Sig Dispense Refill    amLODIPine (NORVASC) 10 MG tablet Take 1 tablet by mouth daily 30 tablet 12    lisinopril (PRINIVIL;ZESTRIL) 5 MG tablet Take 1 tablet by mouth daily 30 tablet 12    MULTIPLE VITAMIN PO Take by mouth daily       No current facility-administered medications for this visit. Review of Systems:  General ROS: negative for - chills or fever  Psychological ROS: negative for - anxiety and depression  Hematological and Lymphatic ROS: No history of blood clots or bleeding disorder. Respiratory ROS: no cough, shortness of breath, or wheezing  Cardiovascular ROS: no chest pain or dyspnea on exertion, tolerates a flight of stairs, vacuuming  Gastrointestinal ROS: no abdominal pain, change in bowel habits, or black or bloody stools  Genito-Urinary ROS: Some twinges in the area of the hydrocele repair, no dysuria, trouble voiding, or hematuria  Musculoskeletal ROS: negative for - muscle pain or muscular weakness  Neurological ROS: negative for - headaches, numbness/tingling, seizures or weakness  Dermatological ROS: negative for - rash or skin lesion changes    Blood pressure 130/82, pulse 78, temperature 98.4 °F (36.9 °C), weight 148 lb (67.1 kg), SpO2 98 %. Physical Examination:   General appearance - alert, thin,well appearing, and in no distress  Mental status - alert, oriented to person, place, and time  Head - atraumatic, normocephalic  Eyes - pupils equal and reactive to light, extraocular muscles intact  Ears - external ears are normal, hearing is grossly normal  Mouth - oral pharynx clear, mucous membranes moist  Neck - supple, no significant adenopathy  Chest - clear to auscultation, mild wheezing on exhalation heard in the upper lobes, no rales or rhonchi, symmetric air entry  Heart - normal rate, regular rhythm, normal S1, S2, no murmurs, rubs, clicks or gallops  Abdomen - soft, nontender, non-distended, good bowel sounds.   Neurological - alert, oriented, cranial nerves II-XII intact, motor and sensation are grossly intact bilateral upper and lower extremities.  Walks with a slight limp on the right side due to hip pain.  Using 4-pronged cane for ambulation.  Extremities - peripheral pulses normal, no pedal edema, no clubbing or cyanosis  Skin - warm and dry    An electronic signature was used to authenticate this note.    --Cici Bear DO  PGY-3 on 1/10/2023 seen with Dr. Whatley.  [facility]  [address] [place] [address]  Dept: [phone]  Dept Fax: [phone]  Loc: [phone]

## 2023-01-10 ENCOUNTER — OFFICE VISIT (OUTPATIENT)
Dept: INTERNAL MEDICINE CLINIC | Age: 68
End: 2023-01-10
Payer: MEDICARE

## 2023-01-10 VITALS
DIASTOLIC BLOOD PRESSURE: 82 MMHG | HEART RATE: 78 BPM | SYSTOLIC BLOOD PRESSURE: 130 MMHG | TEMPERATURE: 98.4 F | OXYGEN SATURATION: 98 % | BODY MASS INDEX: 18.02 KG/M2 | WEIGHT: 148 LBS

## 2023-01-10 DIAGNOSIS — M25.551 CHRONIC RIGHT HIP PAIN: ICD-10-CM

## 2023-01-10 DIAGNOSIS — Z00.00 HEALTH MAINTENANCE EXAMINATION: ICD-10-CM

## 2023-01-10 DIAGNOSIS — R53.82 CHRONIC FATIGUE: ICD-10-CM

## 2023-01-10 DIAGNOSIS — G89.29 CHRONIC RIGHT HIP PAIN: ICD-10-CM

## 2023-01-10 DIAGNOSIS — I10 PRIMARY HYPERTENSION: Primary | ICD-10-CM

## 2023-01-10 DIAGNOSIS — Z72.0 TOBACCO ABUSE: ICD-10-CM

## 2023-01-10 PROCEDURE — 3074F SYST BP LT 130 MM HG: CPT | Performed by: STUDENT IN AN ORGANIZED HEALTH CARE EDUCATION/TRAINING PROGRAM

## 2023-01-10 PROCEDURE — 99214 OFFICE O/P EST MOD 30 MIN: CPT | Performed by: STUDENT IN AN ORGANIZED HEALTH CARE EDUCATION/TRAINING PROGRAM

## 2023-01-10 PROCEDURE — 4004F PT TOBACCO SCREEN RCVD TLK: CPT | Performed by: STUDENT IN AN ORGANIZED HEALTH CARE EDUCATION/TRAINING PROGRAM

## 2023-01-10 PROCEDURE — 3078F DIAST BP <80 MM HG: CPT | Performed by: STUDENT IN AN ORGANIZED HEALTH CARE EDUCATION/TRAINING PROGRAM

## 2023-01-10 PROCEDURE — G8484 FLU IMMUNIZE NO ADMIN: HCPCS | Performed by: STUDENT IN AN ORGANIZED HEALTH CARE EDUCATION/TRAINING PROGRAM

## 2023-01-10 PROCEDURE — G8419 CALC BMI OUT NRM PARAM NOF/U: HCPCS | Performed by: STUDENT IN AN ORGANIZED HEALTH CARE EDUCATION/TRAINING PROGRAM

## 2023-01-10 PROCEDURE — 1123F ACP DISCUSS/DSCN MKR DOCD: CPT | Performed by: STUDENT IN AN ORGANIZED HEALTH CARE EDUCATION/TRAINING PROGRAM

## 2023-01-10 PROCEDURE — G8427 DOCREV CUR MEDS BY ELIG CLIN: HCPCS | Performed by: STUDENT IN AN ORGANIZED HEALTH CARE EDUCATION/TRAINING PROGRAM

## 2023-01-10 PROCEDURE — 3017F COLORECTAL CA SCREEN DOC REV: CPT | Performed by: STUDENT IN AN ORGANIZED HEALTH CARE EDUCATION/TRAINING PROGRAM

## 2023-01-10 RX ORDER — AMLODIPINE BESYLATE 10 MG/1
10 TABLET ORAL DAILY
Qty: 30 TABLET | Refills: 12 | Status: SHIPPED | OUTPATIENT
Start: 2023-01-10

## 2023-01-10 ASSESSMENT — PATIENT HEALTH QUESTIONNAIRE - PHQ9
SUM OF ALL RESPONSES TO PHQ QUESTIONS 1-9: 0
SUM OF ALL RESPONSES TO PHQ9 QUESTIONS 1 & 2: 0
SUM OF ALL RESPONSES TO PHQ QUESTIONS 1-9: 0
SUM OF ALL RESPONSES TO PHQ QUESTIONS 1-9: 0
1. LITTLE INTEREST OR PLEASURE IN DOING THINGS: 0
2. FEELING DOWN, DEPRESSED OR HOPELESS: 0
SUM OF ALL RESPONSES TO PHQ QUESTIONS 1-9: 0

## 2023-01-10 NOTE — LETTER
[address] [facility] Internal Medicine  [facility]  [address] [place] [address]  Phone: [phone]  Fax: Candi Pacheco 46, DO         January 10, 2023     Patient: Mahad Corbin   YOB: 1955   Date of Visit: 1/10/2023       To Whom It May Concern: It is my medical opinion that Mahad Corbin requires a disability parking placard for the following reasons:  He cannot walk without assistance from another person or the use of an assistance device (cane, crutch, prosthetic device, wheelchair, etc.). He has difficulty walking due to chronic medical conditions. Duration of need: permanent    If you have any questions or concerns, please don't hesitate to call.     Sincerely,        Hilary Walter, DO

## 2023-01-10 NOTE — LETTER
[address] [facility] Internal Medicine  [facility]  [address] [place] [address]  Phone: [phone]  Fax: Brittnyevelyn Pacheco 44, DO         January 10, 2023     Patient: Suly Long   YOB: 1955   Date of Visit: 1/10/2023       To Whom It May Concern: It is my medical opinion that Suly Long requires a disability parking placard for the following reasons:  Difficulty walking long distances due to chronic medical conditions. He cannot walk without assistance from another person or the use of an assistance device (cane, crutch, prosthetic device, wheelchair, etc.). Duration of need: permanent    If you have any questions or concerns, please don't hesitate to call.     Sincerely,        Jefe Betancourt, DO

## 2023-01-18 ENCOUNTER — OFFICE VISIT (OUTPATIENT)
Dept: UROLOGY | Age: 68
End: 2023-01-18

## 2023-01-18 VITALS — HEIGHT: 76 IN | BODY MASS INDEX: 18.02 KG/M2 | WEIGHT: 148 LBS | RESPIRATION RATE: 20 BRPM

## 2023-01-18 DIAGNOSIS — N43.3 RIGHT HYDROCELE: Primary | ICD-10-CM

## 2023-01-18 DIAGNOSIS — N50.89 SCROTAL SWELLING: ICD-10-CM

## 2023-01-18 PROCEDURE — 99024 POSTOP FOLLOW-UP VISIT: CPT | Performed by: UROLOGY

## 2023-01-18 NOTE — PROGRESS NOTES
Dr. Joseph Bradshaw MD  [facility]  Urology Clinic  New Patient Visit      Patient:  Jaydon Kramer  YOB: 1955  Date: 1/18/2023    HISTORY OF PRESENT ILLNESS:   The patient is a 67 y.o. male who presents today for evaluation of the following problem(s): right hydrocele s/p repair in late 2022.     Overall the problem(s) : are worsening.  Associated Symptoms: No dysuria, gross hematuria.  Pain Severity: 0/10    Summary of old records:   None    Imaging/Labs reviewed during today's visit:  I have independently reviewed and verified the following films during today's visit.  None    Last several PSA's:  No results found for: PSA    Last total testosterone:  No results found for: TESTOSTERONE    Urinalysis today:  No results found for this visit on 01/18/23.      Last BUN and creatinine:  Lab Results   Component Value Date    BUN 19 10/12/2022     Lab Results   Component Value Date    CREATININE 0.8 10/12/2022       Imaging Reviewed during this Office Visit:   (results were independently reviewed by physician and radiology report verified)    PAST MEDICAL, FAMILY AND SOCIAL HISTORY:  Past Medical History:   Diagnosis Date    Hydrocele     right    Hypertension      Past Surgical History:   Procedure Laterality Date    DENTAL SURGERY  2013    dental    HYDROCELE EXCISION Right 10/26/2022    RIGHT HYDROCELECTOMY performed by Joseph Bradshaw MD at [facility] OR     Family History   Problem Relation Age of Onset    Stroke Mother         2014    Other Mother         migraines    Other Father         pulmonary fibrosisi     Outpatient Medications Marked as Taking for the 1/18/23 encounter (Office Visit) with Joseph Bradshaw MD   Medication Sig Dispense Refill    amLODIPine (NORVASC) 10 MG tablet Take 1 tablet by mouth daily 30 tablet 12    lisinopril (PRINIVIL;ZESTRIL) 5 MG tablet Take 1 tablet by mouth daily 30 tablet 12    MULTIPLE VITAMIN PO Take by mouth daily         Patient has no known allergies.  Social History  Tobacco Use   Smoking Status Every Day    Packs/day: 1.00    Years: 52.00    Pack years: 52.00    Types: Cigarettes    Start date: 67   Smokeless Tobacco Never       Social History     Substance and Sexual Activity   Alcohol Use Not Currently    Comment: daily--hasn't drank since christmas       REVIEW OF SYSTEMS:  Constitutional: negative  Eyes: negative  Respiratory: negative  Cardiovascular: negative  Gastrointestinal: negative  Musculoskeletal: negative  Genitourinary: negative except for what is in HPI  Skin: negative   Neurological: negative  Hematological/Lymphatic: negative  Psychological: negative    Physical Exam:    This a 74 y.o. male   Vitals:    01/18/23 1151   Resp: 20     Constitutional: Patient in no acute distress; Neuro: alert and oriented to person place and time. Psych: Mood and affect normal.  Skin: Normal  Lungs: Respiratory effort normal  Cardiovascular:  Normal peripheral pulses  Abdomen: Soft, non-tender, non-distended with no CVA, flank pain, hepatosplenomegaly or hernia. Kidneys normal.  Bladder non-tender and not distended. Lymphatics: no palpable lymphadenopathy  Penis normal and circumcised  Urethral meatus normal  Scrotal exam normal  Testicles  showed large right hydrocele. Assessment and Plan      1. Right hydrocele    2. Scrotal swelling             Plan:      No follow-ups on file. Well healed. Normal activity at this time.           Dr. Mamta Rolle MD

## 2023-06-20 ENCOUNTER — OFFICE VISIT (OUTPATIENT)
Dept: INTERNAL MEDICINE CLINIC | Age: 68
End: 2023-06-20
Payer: MEDICARE

## 2023-06-20 VITALS
HEART RATE: 72 BPM | DIASTOLIC BLOOD PRESSURE: 74 MMHG | TEMPERATURE: 98.7 F | BODY MASS INDEX: 18.27 KG/M2 | WEIGHT: 150 LBS | SYSTOLIC BLOOD PRESSURE: 138 MMHG | HEIGHT: 76 IN

## 2023-06-20 DIAGNOSIS — I10 PRIMARY HYPERTENSION: Primary | ICD-10-CM

## 2023-06-20 DIAGNOSIS — E78.5 DYSLIPIDEMIA: ICD-10-CM

## 2023-06-20 DIAGNOSIS — Z72.0 TOBACCO ABUSE: ICD-10-CM

## 2023-06-20 DIAGNOSIS — R53.82 CHRONIC FATIGUE: ICD-10-CM

## 2023-06-20 DIAGNOSIS — Z87.891 PERSONAL HISTORY OF TOBACCO USE: ICD-10-CM

## 2023-06-20 DIAGNOSIS — Z12.2 SCREENING FOR LUNG CANCER: ICD-10-CM

## 2023-06-20 DIAGNOSIS — Z12.11 SCREENING FOR COLON CANCER: ICD-10-CM

## 2023-06-20 PROCEDURE — 3078F DIAST BP <80 MM HG: CPT | Performed by: STUDENT IN AN ORGANIZED HEALTH CARE EDUCATION/TRAINING PROGRAM

## 2023-06-20 PROCEDURE — G0296 VISIT TO DETERM LDCT ELIG: HCPCS | Performed by: STUDENT IN AN ORGANIZED HEALTH CARE EDUCATION/TRAINING PROGRAM

## 2023-06-20 PROCEDURE — 3075F SYST BP GE 130 - 139MM HG: CPT | Performed by: STUDENT IN AN ORGANIZED HEALTH CARE EDUCATION/TRAINING PROGRAM

## 2023-06-20 PROCEDURE — 1123F ACP DISCUSS/DSCN MKR DOCD: CPT | Performed by: STUDENT IN AN ORGANIZED HEALTH CARE EDUCATION/TRAINING PROGRAM

## 2023-06-20 PROCEDURE — 99213 OFFICE O/P EST LOW 20 MIN: CPT | Performed by: STUDENT IN AN ORGANIZED HEALTH CARE EDUCATION/TRAINING PROGRAM

## 2023-06-20 SDOH — ECONOMIC STABILITY: HOUSING INSECURITY
IN THE LAST 12 MONTHS, WAS THERE A TIME WHEN YOU DID NOT HAVE A STEADY PLACE TO SLEEP OR SLEPT IN A SHELTER (INCLUDING NOW)?: NO

## 2023-06-20 SDOH — ECONOMIC STABILITY: FOOD INSECURITY: WITHIN THE PAST 12 MONTHS, THE FOOD YOU BOUGHT JUST DIDN'T LAST AND YOU DIDN'T HAVE MONEY TO GET MORE.: NEVER TRUE

## 2023-06-20 SDOH — ECONOMIC STABILITY: INCOME INSECURITY: HOW HARD IS IT FOR YOU TO PAY FOR THE VERY BASICS LIKE FOOD, HOUSING, MEDICAL CARE, AND HEATING?: NOT VERY HARD

## 2023-06-20 SDOH — ECONOMIC STABILITY: FOOD INSECURITY: WITHIN THE PAST 12 MONTHS, YOU WORRIED THAT YOUR FOOD WOULD RUN OUT BEFORE YOU GOT MONEY TO BUY MORE.: NEVER TRUE

## 2023-07-21 LAB — NONINV COLON CA DNA+OCC BLD SCRN STL QL: POSITIVE

## 2023-08-21 ENCOUNTER — HOSPITAL ENCOUNTER (OUTPATIENT)
Dept: CT IMAGING | Age: 68
Discharge: HOME | End: 2023-08-21
Payer: MEDICARE

## 2023-08-21 DIAGNOSIS — Z87.891 PERSONAL HISTORY OF TOBACCO USE: ICD-10-CM

## 2023-08-21 DIAGNOSIS — Z72.0 TOBACCO ABUSE: ICD-10-CM

## 2023-08-21 PROCEDURE — 71271 CT THORAX LUNG CANCER SCR C-: CPT

## 2023-11-28 ENCOUNTER — OFFICE VISIT (OUTPATIENT)
Dept: INTERNAL MEDICINE CLINIC | Age: 68
End: 2023-11-28
Payer: MEDICARE

## 2023-11-28 VITALS
DIASTOLIC BLOOD PRESSURE: 96 MMHG | SYSTOLIC BLOOD PRESSURE: 165 MMHG | OXYGEN SATURATION: 98 % | WEIGHT: 148.4 LBS | HEART RATE: 97 BPM | BODY MASS INDEX: 18.07 KG/M2 | HEIGHT: 76 IN

## 2023-11-28 DIAGNOSIS — R19.5 POSITIVE COLORECTAL CANCER SCREENING USING COLOGUARD TEST: ICD-10-CM

## 2023-11-28 DIAGNOSIS — I71.21 ANEURYSM OF ASCENDING AORTA WITHOUT RUPTURE (HCC): ICD-10-CM

## 2023-11-28 DIAGNOSIS — F33.8 SEASONAL AFFECTIVE DISORDER (HCC): ICD-10-CM

## 2023-11-28 DIAGNOSIS — E78.5 DYSLIPIDEMIA: ICD-10-CM

## 2023-11-28 DIAGNOSIS — I10 PRIMARY HYPERTENSION: Primary | ICD-10-CM

## 2023-11-28 DIAGNOSIS — Z72.0 TOBACCO ABUSE: ICD-10-CM

## 2023-11-28 DIAGNOSIS — R26.9 GAIT DISTURBANCE: ICD-10-CM

## 2023-11-28 PROCEDURE — 3077F SYST BP >= 140 MM HG: CPT

## 2023-11-28 PROCEDURE — 1123F ACP DISCUSS/DSCN MKR DOCD: CPT

## 2023-11-28 PROCEDURE — 3080F DIAST BP >= 90 MM HG: CPT

## 2023-11-28 PROCEDURE — 99214 OFFICE O/P EST MOD 30 MIN: CPT

## 2023-11-28 RX ORDER — LISINOPRIL 5 MG/1
5 TABLET ORAL DAILY
Qty: 30 TABLET | Refills: 12 | Status: SHIPPED | OUTPATIENT
Start: 2023-11-28 | End: 2023-12-26 | Stop reason: SDUPTHER

## 2023-11-28 RX ORDER — ATORVASTATIN CALCIUM 20 MG/1
20 TABLET, FILM COATED ORAL DAILY
Qty: 30 TABLET | Refills: 2 | Status: SHIPPED | OUTPATIENT
Start: 2023-11-28 | End: 2023-12-26 | Stop reason: SDUPTHER

## 2023-11-28 RX ORDER — AMLODIPINE BESYLATE 10 MG/1
10 TABLET ORAL DAILY
Qty: 30 TABLET | Refills: 12 | Status: SHIPPED | OUTPATIENT
Start: 2023-11-28 | End: 2023-12-26 | Stop reason: SDUPTHER

## 2023-11-28 RX ORDER — METOPROLOL SUCCINATE 50 MG/1
50 TABLET, EXTENDED RELEASE ORAL DAILY
Qty: 30 TABLET | Refills: 1 | Status: SHIPPED | OUTPATIENT
Start: 2023-11-28 | End: 2023-12-26 | Stop reason: SDUPTHER

## 2023-12-26 ENCOUNTER — OFFICE VISIT (OUTPATIENT)
Dept: INTERNAL MEDICINE CLINIC | Age: 68
End: 2023-12-26
Payer: MEDICARE

## 2023-12-26 VITALS
HEART RATE: 70 BPM | OXYGEN SATURATION: 96 % | DIASTOLIC BLOOD PRESSURE: 80 MMHG | SYSTOLIC BLOOD PRESSURE: 140 MMHG | TEMPERATURE: 97.1 F | WEIGHT: 146 LBS | RESPIRATION RATE: 18 BRPM | BODY MASS INDEX: 17.77 KG/M2

## 2023-12-26 DIAGNOSIS — R29.898 RIGHT LEG WEAKNESS: Primary | ICD-10-CM

## 2023-12-26 DIAGNOSIS — R09.89 PULSE WEAKNESS: ICD-10-CM

## 2023-12-26 DIAGNOSIS — R09.89 DIMINISHED PULSES IN LOWER EXTREMITY: ICD-10-CM

## 2023-12-26 DIAGNOSIS — E78.5 DYSLIPIDEMIA: ICD-10-CM

## 2023-12-26 DIAGNOSIS — F33.8 SEASONAL AFFECTIVE DISORDER (HCC): ICD-10-CM

## 2023-12-26 DIAGNOSIS — I10 PRIMARY HYPERTENSION: ICD-10-CM

## 2023-12-26 DIAGNOSIS — I71.21 ANEURYSM OF ASCENDING AORTA WITHOUT RUPTURE (HCC): ICD-10-CM

## 2023-12-26 PROCEDURE — 99213 OFFICE O/P EST LOW 20 MIN: CPT

## 2023-12-26 PROCEDURE — 1123F ACP DISCUSS/DSCN MKR DOCD: CPT

## 2023-12-26 PROCEDURE — 3077F SYST BP >= 140 MM HG: CPT

## 2023-12-26 PROCEDURE — 3079F DIAST BP 80-89 MM HG: CPT

## 2023-12-26 RX ORDER — METOPROLOL SUCCINATE 50 MG/1
50 TABLET, EXTENDED RELEASE ORAL DAILY
Qty: 30 TABLET | Refills: 1 | Status: SHIPPED | OUTPATIENT
Start: 2023-12-26

## 2023-12-26 RX ORDER — LISINOPRIL 5 MG/1
5 TABLET ORAL DAILY
Qty: 30 TABLET | Refills: 12 | Status: SHIPPED | OUTPATIENT
Start: 2023-12-26

## 2023-12-26 RX ORDER — AMLODIPINE BESYLATE 10 MG/1
10 TABLET ORAL DAILY
Qty: 30 TABLET | Refills: 12 | Status: SHIPPED | OUTPATIENT
Start: 2023-12-26

## 2023-12-26 RX ORDER — ATORVASTATIN CALCIUM 20 MG/1
20 TABLET, FILM COATED ORAL DAILY
Qty: 30 TABLET | Refills: 2 | Status: SHIPPED | OUTPATIENT
Start: 2023-12-26

## 2023-12-26 NOTE — PROGRESS NOTES
1955    Chief Complaint   Patient presents with    Hypertension     4 weeks       Pt is a 68 y.o. male who presents for an initial visit. He present todays for follow up from previous appointment. He has not been taking his new medications because he became nauseous after taking them the first time. We discussed the importance of calling the doctors office of further advice if he experiences adverse effects after resuming his medications. He says he has a follow up appointment in January to discuss the ascending aortic aneurysm that was found on CT scan of his lung in August. He also reports depression during the winter that has been present since he lost his job due to difficulty walking secondary to leg weakness that started a year ago.        HPI      Past Medical History:   Diagnosis Date    Hydrocele     right    Hypertension        Past Surgical History:   Procedure Laterality Date    DENTAL SURGERY  2013    dental    HYDROCELE EXCISION Right 10/26/2022    RIGHT HYDROCELECTOMY performed by Joseph Bradshaw MD at [facility] OR       Current Outpatient Medications   Medication Sig Dispense Refill    atorvastatin (LIPITOR) 20 MG tablet Take 1 tablet by mouth daily 30 tablet 2    amLODIPine (NORVASC) 10 MG tablet Take 1 tablet by mouth daily 30 tablet 12    lisinopril (PRINIVIL;ZESTRIL) 5 MG tablet Take 1 tablet by mouth daily 30 tablet 12    metoprolol succinate (TOPROL XL) 50 MG extended release tablet Take 1 tablet by mouth daily 30 tablet 1    sertraline (ZOLOFT) 50 MG tablet Take 1 tablet by mouth daily 30 tablet 1    MULTIPLE VITAMIN PO Take by mouth daily (Patient not taking: Reported on 12/26/2023)       No current facility-administered medications for this visit.       Allergies   Allergen Reactions    Ciprofloxacin      All fluoroquinolones - known AAA    Levofloxacin      All fluoroquinolones - known AAA         Social History     Socioeconomic History    Marital status:      Spouse name: Not on

## 2023-12-26 NOTE — PATIENT INSTRUCTIONS
Record blood pressures for 1 month. Check a few times per day with your current blood pressure machine.    Bring the blood pressure machine to the office next visit to verify that it works correctly.

## 2024-01-09 ENCOUNTER — TELEPHONE (OUTPATIENT)
Dept: INTERNAL MEDICINE CLINIC | Age: 69
End: 2024-01-09

## 2024-01-09 NOTE — TELEPHONE ENCOUNTER
----- Message from Conchis Miner MD sent at 12/28/2023  6:01 PM EST -----  [facility] and find out if they have seen him and done colonoscopy for abnormal Cologuard.  We referred 11/28/23.  If not done - please help get appt made.

## 2024-01-09 NOTE — TELEPHONE ENCOUNTER
I called GI, they have reached out and left message last one on 12/5/23 Pt has not returned call. I called patient and left message today.

## 2024-01-18 ENCOUNTER — OFFICE VISIT (OUTPATIENT)
Dept: CARDIOTHORACIC SURGERY | Age: 69
End: 2024-01-18
Payer: MEDICARE

## 2024-01-18 VITALS
BODY MASS INDEX: 17.78 KG/M2 | DIASTOLIC BLOOD PRESSURE: 92 MMHG | WEIGHT: 146 LBS | HEART RATE: 81 BPM | HEIGHT: 76 IN | SYSTOLIC BLOOD PRESSURE: 145 MMHG

## 2024-01-18 DIAGNOSIS — R19.00 PULSATILE ABDOMINAL MASS: ICD-10-CM

## 2024-01-18 DIAGNOSIS — I71.21 ANEURYSM OF ASCENDING AORTA WITHOUT RUPTURE (HCC): Primary | ICD-10-CM

## 2024-01-18 PROCEDURE — 99204 OFFICE O/P NEW MOD 45 MIN: CPT | Performed by: THORACIC SURGERY (CARDIOTHORACIC VASCULAR SURGERY)

## 2024-01-18 PROCEDURE — 3080F DIAST BP >= 90 MM HG: CPT | Performed by: THORACIC SURGERY (CARDIOTHORACIC VASCULAR SURGERY)

## 2024-01-18 PROCEDURE — 1123F ACP DISCUSS/DSCN MKR DOCD: CPT | Performed by: THORACIC SURGERY (CARDIOTHORACIC VASCULAR SURGERY)

## 2024-01-18 PROCEDURE — 3077F SYST BP >= 140 MM HG: CPT | Performed by: THORACIC SURGERY (CARDIOTHORACIC VASCULAR SURGERY)

## 2024-01-18 NOTE — PROGRESS NOTES
CT surgery New Patient Office Appointment        Patient's Name/Date of Birth: Jaydon Kramer / 1955 (68 y.o.)    PCP: Conchis Miner MD    Date: January 18, 2024     CC:     Chief Complaint   Patient presents with    New Patient     NP ANEURYSM OF ASCENDING AORTA        HPI  We had the pleasure of seeing Jaydon Kramer in the office today, as you know this is a very pleasant 68 y.o. year old male with a history of ascending aortic aneurysm found during CT lung screen.  His PMHx includes HTN, that has been poorly controlled for several years, dyslipidemia and current 1 PPD smoker for 52 yrs.  No known FHx of aneurysms.          PastMedical History:  Jaydon  has a past medical history of Hydrocele and Hypertension.    Past Surgical History:  The patient  has a past surgical history that includes Dental surgery (2013) and Hydrocele surgery (Right, 10/26/2022).    Allergies:  The patient is allergic to ciprofloxacin and levofloxacin.    Medications:    Current Outpatient Medications:     atorvastatin (LIPITOR) 20 MG tablet, Take 1 tablet by mouth daily, Disp: 30 tablet, Rfl: 2    amLODIPine (NORVASC) 10 MG tablet, Take 1 tablet by mouth daily, Disp: 30 tablet, Rfl: 12    lisinopril (PRINIVIL;ZESTRIL) 5 MG tablet, Take 1 tablet by mouth daily, Disp: 30 tablet, Rfl: 12    metoprolol succinate (TOPROL XL) 50 MG extended release tablet, Take 1 tablet by mouth daily, Disp: 30 tablet, Rfl: 1    MULTIPLE VITAMIN PO, Take by mouth daily, Disp: , Rfl:     sertraline (ZOLOFT) 50 MG tablet, Take 1 tablet by mouth daily (Patient not taking: Reported on 1/18/2024), Disp: 30 tablet, Rfl: 1    Family History:  This patient's family history includes Other in his father and mother; Stroke in his mother.    Social History:  Jaydon  reports that he has been smoking cigarettes. He started smoking about 50 years ago. He has a 52.0 pack-year smoking history. He has never used smokeless tobacco. He reports that he does not currently use

## 2024-01-22 ENCOUNTER — HOSPITAL ENCOUNTER (OUTPATIENT)
Dept: MRI IMAGING | Age: 69
Discharge: HOME | End: 2024-01-22
Payer: MEDICARE

## 2024-01-22 ENCOUNTER — HOSPITAL ENCOUNTER (OUTPATIENT)
Dept: INTERVENTIONAL RADIOLOGY/VASCULAR | Age: 69
Discharge: HOME | End: 2024-01-24
Payer: MEDICARE

## 2024-01-22 DIAGNOSIS — I71.21 ANEURYSM OF ASCENDING AORTA WITHOUT RUPTURE (HCC): ICD-10-CM

## 2024-01-22 DIAGNOSIS — R29.898 RIGHT LEG WEAKNESS: ICD-10-CM

## 2024-01-22 DIAGNOSIS — R09.89 PULSE WEAKNESS: ICD-10-CM

## 2024-01-22 PROCEDURE — 93922 UPR/L XTREMITY ART 2 LEVELS: CPT

## 2024-01-22 PROCEDURE — 72148 MRI LUMBAR SPINE W/O DYE: CPT

## 2024-01-23 ENCOUNTER — OFFICE VISIT (OUTPATIENT)
Dept: INTERNAL MEDICINE CLINIC | Age: 69
End: 2024-01-23
Payer: MEDICARE

## 2024-01-23 VITALS
SYSTOLIC BLOOD PRESSURE: 136 MMHG | WEIGHT: 145.2 LBS | BODY MASS INDEX: 17.68 KG/M2 | HEART RATE: 71 BPM | HEIGHT: 76 IN | OXYGEN SATURATION: 98 % | DIASTOLIC BLOOD PRESSURE: 89 MMHG

## 2024-01-23 DIAGNOSIS — E78.5 DYSLIPIDEMIA: ICD-10-CM

## 2024-01-23 DIAGNOSIS — I71.21 ANEURYSM OF ASCENDING AORTA WITHOUT RUPTURE (HCC): ICD-10-CM

## 2024-01-23 DIAGNOSIS — R19.02 ABDOMINAL MASS, LEFT UPPER QUADRANT: ICD-10-CM

## 2024-01-23 DIAGNOSIS — I10 PRIMARY HYPERTENSION: ICD-10-CM

## 2024-01-23 DIAGNOSIS — Z72.0 TOBACCO ABUSE: ICD-10-CM

## 2024-01-23 DIAGNOSIS — F33.8 SEASONAL AFFECTIVE DISORDER (HCC): ICD-10-CM

## 2024-01-23 DIAGNOSIS — R29.898 RIGHT LEG WEAKNESS: Primary | ICD-10-CM

## 2024-01-23 PROCEDURE — 3075F SYST BP GE 130 - 139MM HG: CPT

## 2024-01-23 PROCEDURE — 99214 OFFICE O/P EST MOD 30 MIN: CPT

## 2024-01-23 PROCEDURE — 1123F ACP DISCUSS/DSCN MKR DOCD: CPT

## 2024-01-23 PROCEDURE — 3079F DIAST BP 80-89 MM HG: CPT

## 2024-01-23 ASSESSMENT — PATIENT HEALTH QUESTIONNAIRE - PHQ9
6. FEELING BAD ABOUT YOURSELF - OR THAT YOU ARE A FAILURE OR HAVE LET YOURSELF OR YOUR FAMILY DOWN: 0
SUM OF ALL RESPONSES TO PHQ9 QUESTIONS 1 & 2: 0
8. MOVING OR SPEAKING SO SLOWLY THAT OTHER PEOPLE COULD HAVE NOTICED. OR THE OPPOSITE, BEING SO FIGETY OR RESTLESS THAT YOU HAVE BEEN MOVING AROUND A LOT MORE THAN USUAL: 0
2. FEELING DOWN, DEPRESSED OR HOPELESS: 0
SUM OF ALL RESPONSES TO PHQ QUESTIONS 1-9: 1
4. FEELING TIRED OR HAVING LITTLE ENERGY: 1
9. THOUGHTS THAT YOU WOULD BE BETTER OFF DEAD, OR OF HURTING YOURSELF: 0
SUM OF ALL RESPONSES TO PHQ QUESTIONS 1-9: 1
SUM OF ALL RESPONSES TO PHQ QUESTIONS 1-9: 1
7. TROUBLE CONCENTRATING ON THINGS, SUCH AS READING THE NEWSPAPER OR WATCHING TELEVISION: 0
1. LITTLE INTEREST OR PLEASURE IN DOING THINGS: 0
3. TROUBLE FALLING OR STAYING ASLEEP: 0
10. IF YOU CHECKED OFF ANY PROBLEMS, HOW DIFFICULT HAVE THESE PROBLEMS MADE IT FOR YOU TO DO YOUR WORK, TAKE CARE OF THINGS AT HOME, OR GET ALONG WITH OTHER PEOPLE: 0
SUM OF ALL RESPONSES TO PHQ QUESTIONS 1-9: 1
5. POOR APPETITE OR OVEREATING: 0

## 2024-01-23 NOTE — PROGRESS NOTES
1955    Chief Complaint   Patient presents with    Extremity Weakness     right    Hypertension    Hyperlipidemia       Pt is a 68 y.o. male who presents for an initial visit. He present todays for follow up from previous appointment. He has had trouble with feeling nauseous and \"sick\" after taking his new medications: metoprolol and atorvastatin. This is the second time the patient has had difficulty with adhering to his new medications due to adverse effects. The new plan is to start the metoprolol without the atorvastatin, and then to introduce the atorvastatin a few days later to determine which medication is causing the problems. We discussed the importance of calling the doctors office of further advice if he experiences adverse effects after resuming his medications.    The lumbar MRI previously ordered showed mild canal stenosis along with multiple areas of mild-moderate foraminal stenosis along the Lumbar spine. The patient continues to have weakness in the leg that has not worsened or improved. He continues to deny any pain or numbness/tingling.     The lumbar MRI also identified a possible left adrenal nodule measuring 3.3 x 2.6 cm in size. CT scan of the abdomen would be helpful for more complete evaluation in this patient.     The patient is seeing Dr. Aquino for evaluation and management of his ascending aortic aneurysm. During his initial appointment Dr. Burrows noted an abdominal mass that was pulsating. He ordered a CTA of the abdomen and chest to further assess for additional aortic aneurysms.     I spoke with the radiology department about evaluating the adrenal nodule using the CTA of the abdomen ordered by Dr. Aquino, and the radiology department felt that this study would be adequate for further evaluation of the nodule.        Past Medical History:   Diagnosis Date    Hydrocele     right    Hypertension        Past Surgical History:   Procedure Laterality Date    DENTAL SURGERY  2013    dental

## 2024-01-23 NOTE — PATIENT INSTRUCTIONS
Try the metoprolol by itself to see how you tolerate it. Call the office if you have problems  Try the atorvastatin the next day to see how you tolerate it.   Call the office for additional instructions within a few days of trying the medications if you have any trouble with the medications.  Referral for orthopedics was made to evaluate right leg weakness.

## 2024-02-08 ENCOUNTER — HOSPITAL ENCOUNTER (OUTPATIENT)
Dept: CT IMAGING | Age: 69
Discharge: HOME | End: 2024-02-08
Payer: MEDICARE

## 2024-02-08 DIAGNOSIS — I71.21 ANEURYSM OF ASCENDING AORTA WITHOUT RUPTURE (HCC): ICD-10-CM

## 2024-02-08 DIAGNOSIS — R19.00 PULSATILE ABDOMINAL MASS: ICD-10-CM

## 2024-02-08 LAB — POC CREATININE WHOLE BLOOD: 0.8 MG/DL (ref 0.5–1.2)

## 2024-02-08 PROCEDURE — 82565 ASSAY OF CREATININE: CPT

## 2024-02-08 PROCEDURE — 74174 CTA ABD&PLVS W/CONTRAST: CPT

## 2024-02-08 PROCEDURE — 71275 CT ANGIOGRAPHY CHEST: CPT

## 2024-02-08 PROCEDURE — 6360000004 HC RX CONTRAST MEDICATION: Performed by: PHYSICIAN ASSISTANT

## 2024-02-08 RX ADMIN — IOPAMIDOL 80 ML: 755 INJECTION, SOLUTION INTRAVENOUS at 07:50

## 2024-02-20 ENCOUNTER — OFFICE VISIT (OUTPATIENT)
Dept: INTERNAL MEDICINE CLINIC | Age: 69
End: 2024-02-20
Payer: MEDICARE

## 2024-02-20 VITALS
TEMPERATURE: 97.8 F | OXYGEN SATURATION: 97 % | BODY MASS INDEX: 17.7 KG/M2 | HEART RATE: 78 BPM | RESPIRATION RATE: 18 BRPM | SYSTOLIC BLOOD PRESSURE: 144 MMHG | DIASTOLIC BLOOD PRESSURE: 80 MMHG | WEIGHT: 145.4 LBS

## 2024-02-20 DIAGNOSIS — I10 PRIMARY HYPERTENSION: Primary | ICD-10-CM

## 2024-02-20 DIAGNOSIS — I71.21 ANEURYSM OF ASCENDING AORTA WITHOUT RUPTURE (HCC): ICD-10-CM

## 2024-02-20 DIAGNOSIS — R29.898 RIGHT LEG WEAKNESS: ICD-10-CM

## 2024-02-20 DIAGNOSIS — E78.5 DYSLIPIDEMIA: ICD-10-CM

## 2024-02-20 PROCEDURE — 99213 OFFICE O/P EST LOW 20 MIN: CPT

## 2024-02-20 PROCEDURE — 3079F DIAST BP 80-89 MM HG: CPT

## 2024-02-20 PROCEDURE — 1123F ACP DISCUSS/DSCN MKR DOCD: CPT

## 2024-02-20 PROCEDURE — 3077F SYST BP >= 140 MM HG: CPT

## 2024-02-20 RX ORDER — LISINOPRIL 10 MG/1
10 TABLET ORAL DAILY
Qty: 30 TABLET | Refills: 3 | Status: SHIPPED | OUTPATIENT
Start: 2024-02-20

## 2024-02-20 NOTE — PROGRESS NOTES
PROFESSIONAL SERVS  [facility] INTERNAL MEDICINE  [address] [place], [place] [id number]  Dept: [phone]  Dept Fax: [phone]  Loc: [phone]

## 2024-02-20 NOTE — PATIENT INSTRUCTIONS
New blood pressure goal 120-140 / 70-80     Schedule follow up appointment with gastroenterology for colonoscopy.

## 2024-02-22 ENCOUNTER — OFFICE VISIT (OUTPATIENT)
Dept: CARDIOTHORACIC SURGERY | Age: 69
End: 2024-02-22
Payer: MEDICARE

## 2024-02-22 VITALS
WEIGHT: 146 LBS | HEART RATE: 64 BPM | DIASTOLIC BLOOD PRESSURE: 82 MMHG | BODY MASS INDEX: 17.78 KG/M2 | OXYGEN SATURATION: 98 % | HEIGHT: 76 IN | SYSTOLIC BLOOD PRESSURE: 124 MMHG

## 2024-02-22 DIAGNOSIS — I71.21 ANEURYSM OF ASCENDING AORTA WITHOUT RUPTURE (HCC): Primary | ICD-10-CM

## 2024-02-22 PROCEDURE — 3079F DIAST BP 80-89 MM HG: CPT | Performed by: PHYSICIAN ASSISTANT

## 2024-02-22 PROCEDURE — 1123F ACP DISCUSS/DSCN MKR DOCD: CPT | Performed by: PHYSICIAN ASSISTANT

## 2024-02-22 PROCEDURE — 99214 OFFICE O/P EST MOD 30 MIN: CPT | Performed by: PHYSICIAN ASSISTANT

## 2024-02-22 PROCEDURE — 3074F SYST BP LT 130 MM HG: CPT | Performed by: PHYSICIAN ASSISTANT

## 2024-02-22 NOTE — PROGRESS NOTES
CT surgery New Patient Office Appointment        Patient's Name/Date of Birth: Jaydon Kramer / 1955 (68 y.o.)    PCP: Conchis Miner MD    Date: February 22, 2024     CC:     Chief Complaint   Patient presents with    Follow-up     Aneurysm of ascending aorta without rupture    Results     CTA chest/ Abd/ pelvis       HPI  We had the pleasure of seeing Jaydon Kramer in the office today, as you know this is a very pleasant 68 y.o. year old male with a history of recently identified ascending aortic aneurysm, HTN, that has been poorly controlled for several years, dyslipidemia and current 1 PPD smoker for 52 yrs.  No known FHx of aneurysms.  His ascending aortic aneurysm (4.6 x 5.3 cm) was found during CT lung screen. He had an initial visit with Jaydon Hale PA-C in our office on 1/18/24. He returns to our office for follow up. Chest CTA W/WO contrast was ordered, showing the ascending thoracic aorta is aneurysmal measuring 4.8 x 4.3 cm and is stable in size. Patient denies chest pain, back pain, SOB, N/V/D, fever, chills.  We discussed the importance of medication compliance and tight blood pressure control.      PastMedical History:  Jaydon  has a past medical history of Hydrocele and Hypertension.    Past Surgical History:  The patient  has a past surgical history that includes Dental surgery (2013) and Hydrocele surgery (Right, 10/26/2022).    Allergies:  The patient is allergic to ciprofloxacin and levofloxacin.    Medications:    Current Outpatient Medications:     lisinopril (PRINIVIL;ZESTRIL) 10 MG tablet, Take 1 tablet by mouth daily, Disp: 30 tablet, Rfl: 3    atorvastatin (LIPITOR) 20 MG tablet, Take 1 tablet by mouth daily, Disp: 30 tablet, Rfl: 2    amLODIPine (NORVASC) 10 MG tablet, Take 1 tablet by mouth daily, Disp: 30 tablet, Rfl: 12    metoprolol succinate (TOPROL XL) 50 MG extended release tablet, Take 1 tablet by mouth daily, Disp: 30 tablet, Rfl: 1    MULTIPLE VITAMIN PO, Take by mouth daily,

## 2024-03-19 ENCOUNTER — OFFICE VISIT (OUTPATIENT)
Dept: INTERNAL MEDICINE CLINIC | Age: 69
End: 2024-03-19
Payer: MEDICARE

## 2024-03-19 VITALS
HEART RATE: 73 BPM | HEIGHT: 76 IN | WEIGHT: 149.8 LBS | OXYGEN SATURATION: 98 % | BODY MASS INDEX: 18.24 KG/M2 | DIASTOLIC BLOOD PRESSURE: 78 MMHG | SYSTOLIC BLOOD PRESSURE: 130 MMHG

## 2024-03-19 DIAGNOSIS — I10 PRIMARY HYPERTENSION: Primary | ICD-10-CM

## 2024-03-19 DIAGNOSIS — I71.23 ANEURYSM OF DESCENDING THORACIC AORTA WITHOUT RUPTURE (HCC): ICD-10-CM

## 2024-03-19 PROCEDURE — 99213 OFFICE O/P EST LOW 20 MIN: CPT | Performed by: STUDENT IN AN ORGANIZED HEALTH CARE EDUCATION/TRAINING PROGRAM

## 2024-03-19 PROCEDURE — 3078F DIAST BP <80 MM HG: CPT | Performed by: STUDENT IN AN ORGANIZED HEALTH CARE EDUCATION/TRAINING PROGRAM

## 2024-03-19 PROCEDURE — 3075F SYST BP GE 130 - 139MM HG: CPT | Performed by: STUDENT IN AN ORGANIZED HEALTH CARE EDUCATION/TRAINING PROGRAM

## 2024-03-19 PROCEDURE — 1123F ACP DISCUSS/DSCN MKR DOCD: CPT | Performed by: STUDENT IN AN ORGANIZED HEALTH CARE EDUCATION/TRAINING PROGRAM

## 2024-03-19 NOTE — PROGRESS NOTES
1955    Chief Complaint   Patient presents with    Hypertension     HPI: Pt is a 68 y.o. male who follows us for hypertension. The lumbar MRI previously ordered showed mild canal stenosis along with multiple areas of mild-moderate foraminal stenosis along the Lumbar spine. The patient continues to have weakness in the leg that has not worsened or improved. He continues to deny any pain or numbness/tingling. States he was going to get a steroid injection in his back but that it was cancelled for some reason, unsure why. States possibly his insurance would not approve? Will look into the matter. The lumbar MRI also identified a possible left adrenal nodule measuring 3.3 x 2.6 cm in size. CT scan of the abdomen would be helpful for more complete evaluation in this patient. The patient is seeing Dr. Aquino for evaluation and management of his ascending aortic aneurysm. During his initial appointment Dr. Aquino noted an abdominal mass that was pulsating. He has his follow up appointment with Dr. Aquino on 2/22/24.     Office Visit (03/19/24): Pt presents for BP f/u since increasing Lisinopril at last visit to 10 mg. BP has been better controlled since. Brought log with him, checks every 2-4 days. SBP readings persistently 125-140 mmHg. Reports slightly more fatigued with tighter BP, otherwise no adverse events or side effects noted. Due for bloodwork. Will check BMP prior to next visit. Saw CT surgery for aortic aneurysms on few weeks ago. CTA revealed 4.1 x 3.1 cm, 3.2 x 3.2 cm and 2.8 x 2.8 cm respectively. The proximal descending thoracic aorta is aneurysmal. He is to continue follow up with them for annual surveillance. Also states that he's established appointment with GI finally and will see them next week.     Past Medical History:   Diagnosis Date    Hydrocele     right    Hypertension      Past Surgical History:   Procedure Laterality Date    DENTAL SURGERY  2013    dental    HYDROCELE EXCISION Right 10/26/2022

## 2024-03-22 DIAGNOSIS — I71.21 ANEURYSM OF ASCENDING AORTA WITHOUT RUPTURE (HCC): ICD-10-CM

## 2024-03-22 DIAGNOSIS — I10 PRIMARY HYPERTENSION: ICD-10-CM

## 2024-03-26 RX ORDER — METOPROLOL SUCCINATE 50 MG/1
50 TABLET, EXTENDED RELEASE ORAL DAILY
Qty: 30 TABLET | Refills: 1 | Status: SHIPPED | OUTPATIENT
Start: 2024-03-26

## 2024-03-28 ENCOUNTER — PROCEDURE VISIT (OUTPATIENT)
Dept: NEUROLOGY | Age: 69
End: 2024-03-28
Payer: MEDICARE

## 2024-03-28 DIAGNOSIS — R29.898 BILATERAL LEG WEAKNESS: Primary | ICD-10-CM

## 2024-03-28 DIAGNOSIS — G62.9 NEUROPATHY: ICD-10-CM

## 2024-03-28 DIAGNOSIS — M54.16 LUMBAR RADICULOPATHY: ICD-10-CM

## 2024-03-28 PROCEDURE — 95886 MUSC TEST DONE W/N TEST COMP: CPT | Performed by: PSYCHIATRY & NEUROLOGY

## 2024-03-28 PROCEDURE — 95910 NRV CNDJ TEST 7-8 STUDIES: CPT | Performed by: PSYCHIATRY & NEUROLOGY

## 2024-04-04 ENCOUNTER — TELEPHONE (OUTPATIENT)
Dept: PHARMACY | Facility: CLINIC | Age: 69
End: 2024-04-04

## 2024-04-04 NOTE — TELEPHONE ENCOUNTER
PLAN  Per insurer report, LIS-1 - may be able to receive up to a 100-day supply for the same cost as a 30-day supply.  The following are interventions that have been identified:   Patient eligible for 100 day supply of Lisinopril and Atorvastatin  Unclear if pt. Is on 5mg or 10mg Lisinopril  Attempting to reach patient to review changing prescription from a 30-day supply to a 100-day supply.  Left message asking for return call. [employer] message sent to patient.    Last Visit: 03/19/24  Next Visit: 06/18/24  Katy Batista CPhT  Population Health Clinical   [facility]  Toll Free: [phone] Option 2    For Pharmacy Admin Tracking Only    Program: [employer]  CPA in place:  No  Gap Closed?: No   Time Spent (min): 15

## 2024-05-27 DIAGNOSIS — E78.5 DYSLIPIDEMIA: ICD-10-CM

## 2024-05-27 DIAGNOSIS — I71.21 ANEURYSM OF ASCENDING AORTA WITHOUT RUPTURE (HCC): ICD-10-CM

## 2024-05-27 DIAGNOSIS — I10 PRIMARY HYPERTENSION: ICD-10-CM

## 2024-05-28 RX ORDER — ATORVASTATIN CALCIUM 20 MG/1
20 TABLET, FILM COATED ORAL DAILY
Qty: 90 TABLET | Refills: 3 | Status: SHIPPED | OUTPATIENT
Start: 2024-05-28

## 2024-05-28 RX ORDER — METOPROLOL SUCCINATE 50 MG/1
50 TABLET, EXTENDED RELEASE ORAL DAILY
Qty: 90 TABLET | Refills: 3 | Status: SHIPPED | OUTPATIENT
Start: 2024-05-28

## 2024-06-05 DIAGNOSIS — I10 PRIMARY HYPERTENSION: ICD-10-CM

## 2024-06-05 RX ORDER — LISINOPRIL 10 MG/1
10 TABLET ORAL DAILY
Qty: 90 TABLET | Refills: 3 | Status: SHIPPED | OUTPATIENT
Start: 2024-06-05

## 2024-06-18 ENCOUNTER — OFFICE VISIT (OUTPATIENT)
Dept: INTERNAL MEDICINE CLINIC | Age: 69
End: 2024-06-18

## 2024-06-18 VITALS
OXYGEN SATURATION: 97 % | DIASTOLIC BLOOD PRESSURE: 90 MMHG | SYSTOLIC BLOOD PRESSURE: 142 MMHG | BODY MASS INDEX: 18.07 KG/M2 | WEIGHT: 148.4 LBS | HEIGHT: 76 IN | HEART RATE: 89 BPM

## 2024-06-18 DIAGNOSIS — Z72.0 TOBACCO ABUSE: ICD-10-CM

## 2024-06-18 DIAGNOSIS — N40.0 ENLARGED PROSTATE: ICD-10-CM

## 2024-06-18 DIAGNOSIS — I10 PRIMARY HYPERTENSION: Primary | ICD-10-CM

## 2024-06-18 DIAGNOSIS — Z12.5 PROSTATE CANCER SCREENING: ICD-10-CM

## 2024-06-18 DIAGNOSIS — R19.5 POSITIVE COLORECTAL CANCER SCREENING USING COLOGUARD TEST: ICD-10-CM

## 2024-06-18 DIAGNOSIS — E78.5 DYSLIPIDEMIA: ICD-10-CM

## 2024-06-18 RX ORDER — LISINOPRIL 20 MG/1
20 TABLET ORAL DAILY
Qty: 90 TABLET | Refills: 1 | Status: SHIPPED | OUTPATIENT
Start: 2024-06-18

## 2024-06-18 RX ORDER — TAMSULOSIN HYDROCHLORIDE 0.4 MG/1
0.4 CAPSULE ORAL DAILY
Qty: 90 CAPSULE | Refills: 1 | Status: SHIPPED | OUTPATIENT
Start: 2024-06-18

## 2024-06-18 NOTE — PROGRESS NOTES
Examination: General appearance -alert, thin, and in no distress  Mental status - alert, oriented to person, place, and time. No blunted affect. Mood congruent affect.  Head - atraumatic, normocephalic  Eyes - pupils equal and reactive to light, extraocular muscles intact  Ears - external ears are normal, hearing is grossly normal  Mouth - oral pharynx clear, mucous membranes moist  Neck - supple, no significant adenopathy  Chest - lung sounds decreased but otherwise clear to auscultation, no wheezes, rales or rhonchi, symmetric air entry  Heart - normal rate, regular rhythm, normal S1, S2, no murmurs, rubs, clicks or gallops  Abdomen - Soft, nontender, nondistended  Extremities - peripheral pulses intact, no pedal edema, no clubbing or cyanosis  Skin - warm and dry        Assessment and Plan:     Diagnosis Orders   1. Primary hypertension  lisinopril (PRINIVIL;ZESTRIL) 20 MG tablet    Comprehensive Metabolic Panel    CBC      2. Enlarged prostate  [facility]      3. Dyslipidemia  Lipid Panel      4. Prostate cancer screening  PSA Screening          HTN: increased patients lisinopril to 20 mg. Will get CMP to evaluate kidney function and K level    Enlarged prostate: follow up with urology, patient given referral. Found on CT scan.    Dyslipidemia: continue statin. Repeat fasting lipid panel prior to next appointment    Prostate Cancer screening: PSA level    R lower extremity weakness: follow up with Dr. Paez now that EMG has been performed.    Positive Cologuard: patient to follow up with Dr. Costello for colonoscopy.     Adrenal Nodule: patient does not with to work this up at the current time. Will discuss at next appointment and consider workup at that time.     Smoking: patient is down to smoking less than a pack today, discussed quitting. Offered medication to help, he decided not to pursue medical management at this time but seems like he is open to it in the future.          Staffed with:

## 2024-07-17 ENCOUNTER — TELEPHONE (OUTPATIENT)
Dept: INTERNAL MEDICINE CLINIC | Age: 69
End: 2024-07-17

## 2024-07-17 NOTE — TELEPHONE ENCOUNTER
----- Message from Conchis Miner MD sent at 7/14/2024 11:25 PM EDT -----  Please call patient and remind him to get labs done fasting.

## 2024-07-22 DIAGNOSIS — I10 PRIMARY HYPERTENSION: ICD-10-CM

## 2024-07-26 RX ORDER — AMLODIPINE BESYLATE 10 MG/1
10 TABLET ORAL DAILY
Qty: 30 TABLET | Refills: 1 | Status: SHIPPED | OUTPATIENT
Start: 2024-07-26

## 2024-08-20 ENCOUNTER — OFFICE VISIT (OUTPATIENT)
Dept: INTERNAL MEDICINE CLINIC | Age: 69
End: 2024-08-20

## 2024-08-20 ENCOUNTER — LAB (OUTPATIENT)
Dept: LAB | Age: 69
End: 2024-08-20

## 2024-08-20 VITALS
HEART RATE: 68 BPM | WEIGHT: 153.2 LBS | SYSTOLIC BLOOD PRESSURE: 128 MMHG | DIASTOLIC BLOOD PRESSURE: 84 MMHG | BODY MASS INDEX: 18.66 KG/M2 | HEIGHT: 76 IN | TEMPERATURE: 98.5 F

## 2024-08-20 DIAGNOSIS — I10 PRIMARY HYPERTENSION: Primary | ICD-10-CM

## 2024-08-20 DIAGNOSIS — R53.82 CHRONIC FATIGUE: ICD-10-CM

## 2024-08-20 DIAGNOSIS — E78.5 DYSLIPIDEMIA: ICD-10-CM

## 2024-08-20 DIAGNOSIS — R26.9 GAIT DISTURBANCE: ICD-10-CM

## 2024-08-20 DIAGNOSIS — N40.0 ENLARGED PROSTATE: ICD-10-CM

## 2024-08-20 DIAGNOSIS — R19.5 POSITIVE COLORECTAL CANCER SCREENING USING COLOGUARD TEST: ICD-10-CM

## 2024-08-20 DIAGNOSIS — I10 PRIMARY HYPERTENSION: ICD-10-CM

## 2024-08-20 DIAGNOSIS — I71.21 ANEURYSM OF ASCENDING AORTA WITHOUT RUPTURE (HCC): ICD-10-CM

## 2024-08-20 DIAGNOSIS — Z72.0 TOBACCO ABUSE: ICD-10-CM

## 2024-08-20 DIAGNOSIS — Z12.5 PROSTATE CANCER SCREENING: ICD-10-CM

## 2024-08-20 LAB
ALBUMIN SERPL BCG-MCNC: 4.3 G/DL (ref 3.5–5.1)
ALP SERPL-CCNC: 155 U/L (ref 38–126)
ALT SERPL W/O P-5'-P-CCNC: 8 U/L (ref 11–66)
ANION GAP SERPL CALC-SCNC: 11 MEQ/L (ref 8–16)
AST SERPL-CCNC: 10 U/L (ref 5–40)
BILIRUB SERPL-MCNC: 0.4 MG/DL (ref 0.3–1.2)
BUN SERPL-MCNC: 17 MG/DL (ref 7–22)
CALCIUM SERPL-MCNC: 9.9 MG/DL (ref 8.5–10.5)
CHLORIDE SERPL-SCNC: 99 MEQ/L (ref 98–111)
CO2 SERPL-SCNC: 27 MEQ/L (ref 23–33)
CREAT SERPL-MCNC: 0.8 MG/DL (ref 0.4–1.2)
DEPRECATED RDW RBC AUTO: 46 FL (ref 35–45)
ERYTHROCYTE [DISTWIDTH] IN BLOOD BY AUTOMATED COUNT: 13 % (ref 11.5–14.5)
GFR SERPL CREATININE-BSD FRML MDRD: > 90 ML/MIN/1.73M2
GLUCOSE SERPL-MCNC: 87 MG/DL (ref 70–108)
HCT VFR BLD AUTO: 45 % (ref 42–52)
HGB BLD-MCNC: 14.9 GM/DL (ref 14–18)
MCH RBC QN AUTO: 31.6 PG (ref 26–33)
MCHC RBC AUTO-ENTMCNC: 33.1 GM/DL (ref 32.2–35.5)
MCV RBC AUTO: 95.5 FL (ref 80–94)
PLATELET # BLD AUTO: 259 THOU/MM3 (ref 130–400)
PMV BLD AUTO: 11.7 FL (ref 9.4–12.4)
POTASSIUM SERPL-SCNC: 4.4 MEQ/L (ref 3.5–5.2)
PROT SERPL-MCNC: 7.5 G/DL (ref 6.1–8)
PSA SERPL-MCNC: 3.06 NG/ML (ref 0–1)
RBC # BLD AUTO: 4.71 MILL/MM3 (ref 4.7–6.1)
SODIUM SERPL-SCNC: 137 MEQ/L (ref 135–145)
WBC # BLD AUTO: 10.9 THOU/MM3 (ref 4.8–10.8)

## 2024-08-20 RX ORDER — AMLODIPINE BESYLATE 10 MG/1
10 TABLET ORAL DAILY
Qty: 90 TABLET | Refills: 1 | Status: SHIPPED | OUTPATIENT
Start: 2024-08-20 | End: 2024-09-30 | Stop reason: SDUPTHER

## 2024-08-20 RX ORDER — ATORVASTATIN CALCIUM 20 MG/1
20 TABLET, FILM COATED ORAL DAILY
Qty: 90 TABLET | Refills: 3 | Status: SHIPPED | OUTPATIENT
Start: 2024-08-20 | End: 2024-10-22

## 2024-08-20 SDOH — ECONOMIC STABILITY: INCOME INSECURITY: HOW HARD IS IT FOR YOU TO PAY FOR THE VERY BASICS LIKE FOOD, HOUSING, MEDICAL CARE, AND HEATING?: SOMEWHAT HARD

## 2024-08-20 SDOH — ECONOMIC STABILITY: FOOD INSECURITY: WITHIN THE PAST 12 MONTHS, THE FOOD YOU BOUGHT JUST DIDN'T LAST AND YOU DIDN'T HAVE MONEY TO GET MORE.: NEVER TRUE

## 2024-08-20 SDOH — ECONOMIC STABILITY: FOOD INSECURITY: WITHIN THE PAST 12 MONTHS, YOU WORRIED THAT YOUR FOOD WOULD RUN OUT BEFORE YOU GOT MONEY TO BUY MORE.: NEVER TRUE

## 2024-08-20 NOTE — PATIENT INSTRUCTIONS
-Continue taking Lisinopril 20 mg daily  -Continue taking amlodipine 10 mg daily   -Restart Lipitor 20 mg daily   -please get lab work done - DO NOT DO LIPID PANEL   Can go to [facility]:  [address] [place] [address] P: [phone]  -Stop smoking, goal is to be completely off cigarettes by next visit   -Make an appointment with Dr. Coronado for colonoscopy, call and get details about a ride. - office number [phone]   -Call Dr. Dunaway to make appointment for follow up on EMG - office number [phone]   -Follow up with urology at appointment tomorrow, talk to them about Flomax issues

## 2024-08-20 NOTE — PROGRESS NOTES
1955    Chief Complaint   Patient presents with    Hypertension     2 months     Hyperlipidemia    Adrenal nodule        Pt is a 68 y.o. male who presents for a follow up. Since last visit pt has stopped taking all of his medications besides his lisinopril and amlodipine. He thought the medications might be making him tired. He is doing well on lisinopril without side effects. Pt has not followed up with Dr. Coronado for colonoscopy or Dr. Hargrove for his leg weakness. He is scheduled with urology tomorrow. He did not get lab work done.       Past Medical History:   Diagnosis Date    AAA (abdominal aortic aneurysm) (HCC)     Hydrocele     right    Hypercholesterolemia     Hypertension     Lumbosacral spinal stenosis     Tobacco abuse        Past Surgical History:   Procedure Laterality Date    DENTAL SURGERY  2013    dental    HYDROCELE EXCISION Right 10/26/2022    RIGHT HYDROCELECTOMY performed by Joseph [facility]shaw MD at [facility] OR       Current Outpatient Medications   Medication Sig Dispense Refill    amLODIPine (NORVASC) 10 MG tablet Take 1 tablet by mouth daily 90 tablet 1    atorvastatin (LIPITOR) 20 MG tablet Take 1 tablet by mouth daily 90 tablet 3    lisinopril (PRINIVIL;ZESTRIL) 20 MG tablet Take 1 tablet by mouth daily 90 tablet 1    MULTIPLE VITAMIN PO Take by mouth daily      tamsulosin (FLOMAX) 0.4 MG capsule Take 1 capsule by mouth daily (Patient not taking: Reported on 8/20/2024) 90 capsule 1    metoprolol succinate (TOPROL XL) 50 MG extended release tablet take 1 tablet by mouth once daily (Patient not taking: Reported on 8/20/2024) 90 tablet 3    sertraline (ZOLOFT) 50 MG tablet take 1 tablet by mouth once daily (Patient not taking: Reported on 8/20/2024) 90 tablet 3     No current facility-administered medications for this visit.       Allergies   Allergen Reactions    Ciprofloxacin      All fluoroquinolones - known AAA    Levofloxacin      All fluoroquinolones - known AAA         Social History

## 2024-08-21 ENCOUNTER — OFFICE VISIT (OUTPATIENT)
Dept: UROLOGY | Age: 69
End: 2024-08-21
Payer: MEDICARE

## 2024-08-21 VITALS
SYSTOLIC BLOOD PRESSURE: 128 MMHG | HEIGHT: 76 IN | DIASTOLIC BLOOD PRESSURE: 84 MMHG | BODY MASS INDEX: 18.61 KG/M2 | WEIGHT: 152.8 LBS

## 2024-08-21 DIAGNOSIS — N40.0 BENIGN PROSTATIC HYPERPLASIA WITHOUT LOWER URINARY TRACT SYMPTOMS: ICD-10-CM

## 2024-08-21 DIAGNOSIS — N32.81 OAB (OVERACTIVE BLADDER): ICD-10-CM

## 2024-08-21 DIAGNOSIS — N43.2 OTHER HYDROCELE: Primary | ICD-10-CM

## 2024-08-21 PROCEDURE — 3074F SYST BP LT 130 MM HG: CPT | Performed by: UROLOGY

## 2024-08-21 PROCEDURE — 1123F ACP DISCUSS/DSCN MKR DOCD: CPT | Performed by: UROLOGY

## 2024-08-21 PROCEDURE — 3079F DIAST BP 80-89 MM HG: CPT | Performed by: UROLOGY

## 2024-08-21 PROCEDURE — 99214 OFFICE O/P EST MOD 30 MIN: CPT | Performed by: UROLOGY

## 2024-08-21 RX ORDER — OXYBUTYNIN CHLORIDE 10 MG/1
10 TABLET, EXTENDED RELEASE ORAL DAILY
Qty: 90 TABLET | Refills: 3 | Status: SHIPPED | OUTPATIENT
Start: 2024-08-21

## 2024-08-21 NOTE — PROGRESS NOTES
daily 90 tablet 1    atorvastatin (LIPITOR) 20 MG tablet Take 1 tablet by mouth daily 90 tablet 3    lisinopril (PRINIVIL;ZESTRIL) 20 MG tablet Take 1 tablet by mouth daily 90 tablet 1    MULTIPLE VITAMIN PO Take by mouth daily         Ciprofloxacin and Levofloxacin  Social History     Tobacco Use   Smoking Status Every Day    Current packs/day: 1.00    Average packs/day: 1 pack/day for 52.6 years (52.6 ttl pk-yrs)    Types: Cigarettes    Start date: 1974   Smokeless Tobacco Never       Social History     Substance and Sexual Activity   Alcohol Use Not Currently    Comment: daily--hasn't drank since christmas       REVIEW OF SYSTEMS:  Constitutional: negative  Eyes: negative  Respiratory: negative  Cardiovascular: negative  Gastrointestinal: negative  Musculoskeletal: negative  Genitourinary: negative except for what is in HPI  Skin: negative   Neurological: negative  Hematological/Lymphatic: negative  Psychological: negative    Physical Exam:    This a 68 y.o. male   Vitals:    08/21/24 1024   BP: 128/84     Constitutional: Patient in no acute distress;   Neuro: alert and oriented to person place and time.    Psych: Mood and affect normal.  Skin: Normal  Lungs: Respiratory effort normal  Cardiovascular:  Normal peripheral pulses  Abdomen: Soft, non-tender, non-distended with no CVA, flank pain, hepatosplenomegaly or hernia.  Kidneys normal.  Bladder non-tender and not distended.  Lymphatics: no palpable lymphadenopathy  Penis normal and circumcised  Urethral meatus normal  Scrotal exam normal    Assessment and Plan      1. Other hydrocele    2. Benign prostatic hyperplasia without lower urinary tract symptoms    3. OAB (overactive bladder)               Plan:   Assessment & Plan   No follow-ups on file.  Cont to follow PSA  Trial of Ditropan ER.          Dr. Joseph Bradshaw MD

## 2024-09-30 DIAGNOSIS — I10 PRIMARY HYPERTENSION: ICD-10-CM

## 2024-10-03 RX ORDER — AMLODIPINE BESYLATE 10 MG/1
10 TABLET ORAL DAILY
Qty: 90 TABLET | Refills: 1 | Status: SHIPPED | OUTPATIENT
Start: 2024-10-03

## 2024-10-17 DIAGNOSIS — I10 PRIMARY HYPERTENSION: ICD-10-CM

## 2024-10-17 RX ORDER — LISINOPRIL 20 MG/1
20 TABLET ORAL DAILY
Qty: 90 TABLET | Refills: 1 | Status: SHIPPED | OUTPATIENT
Start: 2024-10-17

## 2024-10-22 ENCOUNTER — OFFICE VISIT (OUTPATIENT)
Dept: INTERNAL MEDICINE CLINIC | Age: 69
End: 2024-10-22

## 2024-10-22 VITALS
HEIGHT: 76 IN | OXYGEN SATURATION: 98 % | WEIGHT: 150.8 LBS | HEART RATE: 78 BPM | DIASTOLIC BLOOD PRESSURE: 98 MMHG | BODY MASS INDEX: 18.36 KG/M2 | SYSTOLIC BLOOD PRESSURE: 148 MMHG

## 2024-10-22 DIAGNOSIS — Z00.00 WELCOME TO MEDICARE PREVENTIVE VISIT: Primary | ICD-10-CM

## 2024-10-22 DIAGNOSIS — R19.5 POSITIVE COLORECTAL CANCER SCREENING USING COLOGUARD TEST: ICD-10-CM

## 2024-10-22 ASSESSMENT — PATIENT HEALTH QUESTIONNAIRE - PHQ9
4. FEELING TIRED OR HAVING LITTLE ENERGY: SEVERAL DAYS
SUM OF ALL RESPONSES TO PHQ QUESTIONS 1-9: 2
SUM OF ALL RESPONSES TO PHQ9 QUESTIONS 1 & 2: 1
2. FEELING DOWN, DEPRESSED OR HOPELESS: SEVERAL DAYS
6. FEELING BAD ABOUT YOURSELF - OR THAT YOU ARE A FAILURE OR HAVE LET YOURSELF OR YOUR FAMILY DOWN: NOT AT ALL
3. TROUBLE FALLING OR STAYING ASLEEP: NOT AT ALL
7. TROUBLE CONCENTRATING ON THINGS, SUCH AS READING THE NEWSPAPER OR WATCHING TELEVISION: NOT AT ALL
5. POOR APPETITE OR OVEREATING: NOT AT ALL
SUM OF ALL RESPONSES TO PHQ QUESTIONS 1-9: 2
SUM OF ALL RESPONSES TO PHQ QUESTIONS 1-9: 2
8. MOVING OR SPEAKING SO SLOWLY THAT OTHER PEOPLE COULD HAVE NOTICED. OR THE OPPOSITE, BEING SO FIGETY OR RESTLESS THAT YOU HAVE BEEN MOVING AROUND A LOT MORE THAN USUAL: NOT AT ALL
1. LITTLE INTEREST OR PLEASURE IN DOING THINGS: NOT AT ALL
9. THOUGHTS THAT YOU WOULD BE BETTER OFF DEAD, OR OF HURTING YOURSELF: NOT AT ALL
10. IF YOU CHECKED OFF ANY PROBLEMS, HOW DIFFICULT HAVE THESE PROBLEMS MADE IT FOR YOU TO DO YOUR WORK, TAKE CARE OF THINGS AT HOME, OR GET ALONG WITH OTHER PEOPLE: NOT DIFFICULT AT ALL
SUM OF ALL RESPONSES TO PHQ QUESTIONS 1-9: 2

## 2024-10-22 ASSESSMENT — LIFESTYLE VARIABLES
HOW OFTEN DO YOU HAVE A DRINK CONTAINING ALCOHOL: NEVER
HOW MANY STANDARD DRINKS CONTAINING ALCOHOL DO YOU HAVE ON A TYPICAL DAY: PATIENT DOES NOT DRINK

## 2024-10-22 NOTE — PATIENT INSTRUCTIONS
Please schedule colonoscopy!     A Healthy Heart: Care Instructions  Overview     Coronary artery disease, also called heart disease, occurs when a substance called plaque builds up in the vessels that supply oxygen-rich blood to your heart muscle. This can narrow the blood vessels and reduce blood flow. A heart attack happens when blood flow is completely blocked. A high-fat diet, smoking, and other factors increase the risk of heart disease.  Your doctor has found that you have a chance of having heart disease. A heart-healthy lifestyle can help keep your heart healthy and prevent heart disease. This lifestyle includes eating healthy, being active, staying at a weight that's healthy for you, and not smoking or using tobacco. It also includes taking medicines as directed, managing other health conditions, and trying to get a healthy amount of sleep.  Follow-up care is a key part of your treatment and safety. Be sure to make and go to all appointments, and call your doctor if you are having problems. It's also a good idea to know your test results and keep a list of the medicines you take.  How can you care for yourself at home?  Diet    Use less salt when you cook and eat. This helps lower your blood pressure. Taste food before salting. Add only a little salt when you think you need it. With time, your taste buds will adjust to less salt.     Eat fewer snack items, fast foods, canned soups, and other high-salt, high-fat, processed foods.     Read food labels and try to avoid saturated and trans fats. They increase your risk of heart disease by raising cholesterol levels.     Limit the amount of solid fat--butter, margarine, and shortening--you eat. Use olive, peanut, or canola oil when you cook. Bake, broil, and steam foods instead of frying them.     Eat a variety of fruit and vegetables every day. Dark green, deep orange, red, or yellow fruits and vegetables are especially good for you. Examples include spinach,

## 2024-10-23 NOTE — PROGRESS NOTES
Medicare Annual Wellness Visit    Jaydon Kramer is here for Medicare AWV    Assessment & Plan   Welcome to Medicare preventive visit  Positive colorectal cancer screening using Cologuard test  Recommendations for Preventive Services Due: see orders and patient instructions/AVS.  Recommended screening schedule for the next 5-10 years is provided to the patient in written form: see Patient Instructions/AVS.     No follow-ups on file.     Subjective       Patient's complete Health Risk Assessment and screening values have been reviewed and are found in Flowsheets. The following problems were reviewed today and where indicated follow up appointments were made and/or referrals ordered.    Positive Risk Factor Screenings with Interventions:    Fall Risk:  Do you feel unsteady or are you worried about falling? : (!) yes  2 or more falls in past year?: no  Fall with injury in past year?: no     Interventions:    Reviewed medications, home hazards, visual acuity, and co-morbidities that can increase risk for falls  Recommended Assisted Device    Cognitive:   Clock Drawing Test (CDT): Normal  Words recalled: 0 Words Recalled  Total Score: (!) 2  Total Score Interpretation: Abnormal Mini-Cog  Interventions:  Patient comments: N/A            Inactivity:  On average, how many days per week do you engage in moderate to strenuous exercise (like a brisk walk)?: 0 days (!) Abnormal  On average, how many minutes do you engage in exercise at this level?: 0 min  Interventions:  Patient comments: None     Abnormal BMI (underweight):  Body mass index is 18.36 kg/m². (!) Abnormal  Interventions:  Patient comments: Weight is okay    Dentist Screen:  Have you seen the dentist within the past year?: (!) No    Intervention:  Patient comments: Patient wears dentures     Vision Screen:  Do you have difficulty driving, watching TV, or doing any of your daily activities because of your eyesight?: No  Have you had an eye exam within the past year?: 
orders    Safety:  Do you have working smoke detectors?: (!) No  Do you have non-slip mats or non-slip surfaces or shower bars or grab bars in your shower or bathtub?: (!) No  Interventions:  See A/P for plan and any pertinent orders     Advanced Directives:  Do you have a Living Will?: (!) No    Intervention:  see ACP note    Advance Care Planning   Discussed the patient’s choices for care and treatment preferences in case of a health event that adversely affects decision-making abilities or is life-limiting. Recommended the patient document care preferences in state-specific advance directives. Also reviewed the process of designating a trusted capable adult as an Agent (or Health Care Power of ) to make health care decisions for the patient if the patient becomes unable due to incapacity. Patient was asked to complete advance directive forms, if not already done, and to provide a dated, signed and witnessed (or notarized) copy, per the forms' requirements, to the practice office.    Time spent (minutes): 30 minutes       Tobacco Use:    Tobacco Use      Smoking status: Every Day        Packs/day: 1.00        Years: 1 pack/day for 52.8 years (52.8 ttl pk-yrs)        Types: Cigarettes        Start date: 1974      Smokeless tobacco: Never     Interventions:  See counseling/recommendations below    Tobacco Use Counseling: Patient was counseled on tobacco cessation. Based upon patient's motivation to change his behavior, the following plan was agreed upon: gradual reduction of tobacco use. Educational materials regarding tobacco cessation were provided. Provider spent 30 minutes counseling patient.                   Objective   Vitals:    10/22/24 1449   BP: (!) 148/98   Pulse: 78   SpO2: 98%   Weight: 68.4 kg (150 lb 12.8 oz)   Height: 1.93 m (6' 4\")      Body mass index is 18.36 kg/m².                    Allergies   Allergen Reactions    Ciprofloxacin      All fluoroquinolones - known AAA    Levofloxacin

## 2024-11-20 ENCOUNTER — OFFICE VISIT (OUTPATIENT)
Dept: UROLOGY | Age: 69
End: 2024-11-20
Payer: MEDICARE

## 2024-11-20 VITALS — HEIGHT: 76 IN | BODY MASS INDEX: 18.36 KG/M2

## 2024-11-20 DIAGNOSIS — N43.2 OTHER HYDROCELE: Primary | ICD-10-CM

## 2024-11-20 DIAGNOSIS — N39.41 URINARY INCONTINENCE, URGE: ICD-10-CM

## 2024-11-20 DIAGNOSIS — N43.3 RIGHT HYDROCELE: ICD-10-CM

## 2024-11-20 PROCEDURE — 99212 OFFICE O/P EST SF 10 MIN: CPT

## 2024-11-20 PROCEDURE — 1159F MED LIST DOCD IN RCRD: CPT

## 2024-11-20 PROCEDURE — 1123F ACP DISCUSS/DSCN MKR DOCD: CPT

## 2024-11-20 RX ORDER — OXYBUTYNIN CHLORIDE 10 MG/1
10 TABLET, EXTENDED RELEASE ORAL DAILY
Qty: 90 TABLET | Refills: 1 | Status: SHIPPED | OUTPATIENT
Start: 2024-11-20

## 2024-11-20 NOTE — PROGRESS NOTES
[facility] [place] [facility]  [facility]  [address]  [address]  [facility] [address]  Dept: [phone]  Loc: [phone]  Visit Date: 11/20/2024      JESSICA Kramer is a 68 y.o. male that presents to the urology clinic for urinary urgency follow-up.    Urinary Urgency/Frequency  Worsening LUTS. Primarily urgency and frequency. Prescribed Oxybutynin at last visit which patient did not take it yet. Symptoms remain a bother.    IPSS: 19/4.    History of Right Hydrocelectomy  S/P right hydrocelectomy on 10/26/22 with Dr. Bradshaw. No recurrence to date.    Most Recent PSA: 3.06 (08/20/24)    Last BUN and creatinine:  Lab Results   Component Value Date    BUN 17 08/20/2024     Lab Results   Component Value Date    CREATININE 0.8 08/20/2024         PAST MEDICAL, FAMILY AND SOCIAL HISTORY UPDATE:  Past Medical History:   Diagnosis Date    AAA (abdominal aortic aneurysm) (HCC)     Hydrocele     right    Hypercholesterolemia     Hypertension     Lumbosacral spinal stenosis     Tobacco abuse      Past Surgical History:   Procedure Laterality Date    DENTAL SURGERY  2013    dental    HYDROCELE EXCISION Right 10/26/2022    RIGHT HYDROCELECTOMY performed by Joseph [facility] MD at [facility] OR     Family History   Problem Relation Age of Onset    Stroke Mother         2014    Other Mother         migraines    Other Father         pulmonary fibrosisi     Outpatient Medications Marked as Taking for the 11/20/24 encounter (Office Visit) with Hunter Blas PA-C   Medication Sig Dispense Refill    lisinopril (PRINIVIL;ZESTRIL) 20 MG tablet Take 1 tablet by mouth daily 90 tablet 1    amLODIPine (NORVASC) 10 MG tablet Take 1 tablet by mouth daily 90 tablet 1    MULTIPLE VITAMIN PO Take by mouth daily         Ciprofloxacin and Levofloxacin  Social History     Tobacco Use   Smoking Status Every Day    Current packs/day: 1.00    Average packs/day: 1 pack/day for 52.9 years (52.9 ttl pk-yrs)    Types:

## 2024-12-05 ENCOUNTER — TELEPHONE (OUTPATIENT)
Dept: INTERNAL MEDICINE CLINIC | Age: 69
End: 2024-12-05

## 2024-12-05 NOTE — TELEPHONE ENCOUNTER
----- Message from Dr. Conchis Miner MD sent at 11/18/2024 11:36 PM EST -----  Call patient and see if he started and stayed on Lipitor - if he has - check LDL and ALT now.

## 2025-01-06 ENCOUNTER — TELEPHONE (OUTPATIENT)
Dept: INTERNAL MEDICINE CLINIC | Age: 70
End: 2025-01-06

## 2025-01-06 NOTE — TELEPHONE ENCOUNTER
Patient left voicemail stating he needed a letter of excuse to get out of jury duty. Call placed to patient asking why he cannot participate in jury duty, no answer. Had to leave message asking patient to call office and provide more information.

## 2025-01-09 ENCOUNTER — TELEPHONE (OUTPATIENT)
Dept: INTERNAL MEDICINE CLINIC | Age: 70
End: 2025-01-09

## 2025-01-09 NOTE — TELEPHONE ENCOUNTER
Patient is requesting letter of excuse for Jury Duty on 1/14 be faxed to [phone].    Patient states he is unable to participate due to inability to walk.     Please advise.

## 2025-01-17 ENCOUNTER — TELEPHONE (OUTPATIENT)
Dept: CARDIOTHORACIC SURGERY | Age: 70
End: 2025-01-17

## 2025-01-17 DIAGNOSIS — I71.21 ANEURYSM OF ASCENDING AORTA WITHOUT RUPTURE (HCC): Primary | ICD-10-CM

## 2025-01-17 NOTE — TELEPHONE ENCOUNTER
Patient is being seen 03/06/2025 for an annual ascending aortic aneurysm check up.     Last seen 02/22/2024 by Christine Colvin PA-C, at this visit she states to have patient return in a year with a CT chest.     Should the order be changed to a CTA chest because we are monitoring the aneurysm?     Or is there a specific reason a CT chest was ordered instead?     CT chest is scheduled for 02/13/2025.     Patient also has an abdominal aortic aneurysm, but no order was placed to follow up on that, is a CTA abdomen not needed?     Please advise, thank you.

## 2025-01-23 ENCOUNTER — OFFICE VISIT (OUTPATIENT)
Dept: INTERNAL MEDICINE CLINIC | Age: 70
End: 2025-01-23

## 2025-01-23 VITALS
WEIGHT: 146 LBS | SYSTOLIC BLOOD PRESSURE: 130 MMHG | DIASTOLIC BLOOD PRESSURE: 80 MMHG | OXYGEN SATURATION: 97 % | HEART RATE: 76 BPM | BODY MASS INDEX: 17.78 KG/M2 | HEIGHT: 76 IN

## 2025-01-23 DIAGNOSIS — R19.5 POSITIVE COLORECTAL CANCER SCREENING USING COLOGUARD TEST: ICD-10-CM

## 2025-01-23 DIAGNOSIS — I71.21 ANEURYSM OF ASCENDING AORTA WITHOUT RUPTURE (HCC): ICD-10-CM

## 2025-01-23 DIAGNOSIS — R26.9 GAIT DISTURBANCE: ICD-10-CM

## 2025-01-23 DIAGNOSIS — R97.20 ELEVATED PSA: ICD-10-CM

## 2025-01-23 DIAGNOSIS — Z72.0 TOBACCO ABUSE: ICD-10-CM

## 2025-01-23 DIAGNOSIS — R53.82 CHRONIC FATIGUE: ICD-10-CM

## 2025-01-23 DIAGNOSIS — R63.4 WEIGHT LOSS: ICD-10-CM

## 2025-01-23 DIAGNOSIS — N40.0 ENLARGED PROSTATE: ICD-10-CM
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
## 2025-01-23 DIAGNOSIS — I10 PRIMARY HYPERTENSION: Primary | ICD-10-CM

## 2025-01-23 RX ORDER — AMLODIPINE BESYLATE 10 MG/1
10 TABLET ORAL DAILY
Qty: 90 TABLET | Refills: 1 | Status: SHIPPED | OUTPATIENT
Start: 2025-01-23

## 2025-01-23 RX ORDER — LISINOPRIL 20 MG/1
20 TABLET ORAL DAILY
Qty: 90 TABLET | Refills: 1 | Status: SHIPPED | OUTPATIENT
Start: 2025-01-23

## 2025-01-23 SDOH — ECONOMIC STABILITY: FOOD INSECURITY: WITHIN THE PAST 12 MONTHS, YOU WORRIED THAT YOUR FOOD WOULD RUN OUT BEFORE YOU GOT MONEY TO BUY MORE.: NEVER TRUE

## 2025-01-23 SDOH — ECONOMIC STABILITY: FOOD INSECURITY: WITHIN THE PAST 12 MONTHS, THE FOOD YOU BOUGHT JUST DIDN'T LAST AND YOU DIDN'T HAVE MONEY TO GET MORE.: NEVER TRUE

## 2025-01-23 ASSESSMENT — PATIENT HEALTH QUESTIONNAIRE - PHQ9
7. TROUBLE CONCENTRATING ON THINGS, SUCH AS READING THE NEWSPAPER OR WATCHING TELEVISION: NOT AT ALL
10. IF YOU CHECKED OFF ANY PROBLEMS, HOW DIFFICULT HAVE THESE PROBLEMS MADE IT FOR YOU TO DO YOUR WORK, TAKE CARE OF THINGS AT HOME, OR GET ALONG WITH OTHER PEOPLE: NOT DIFFICULT AT ALL
1. LITTLE INTEREST OR PLEASURE IN DOING THINGS: NEARLY EVERY DAY
SUM OF ALL RESPONSES TO PHQ QUESTIONS 1-9: 7
6. FEELING BAD ABOUT YOURSELF - OR THAT YOU ARE A FAILURE OR HAVE LET YOURSELF OR YOUR FAMILY DOWN: NOT AT ALL
SUM OF ALL RESPONSES TO PHQ9 QUESTIONS 1 & 2: 4
2. FEELING DOWN, DEPRESSED OR HOPELESS: SEVERAL DAYS
SUM OF ALL RESPONSES TO PHQ QUESTIONS 1-9: 7
4. FEELING TIRED OR HAVING LITTLE ENERGY: NEARLY EVERY DAY
SUM OF ALL RESPONSES TO PHQ QUESTIONS 1-9: 7
9. THOUGHTS THAT YOU WOULD BE BETTER OFF DEAD, OR OF HURTING YOURSELF: NOT AT ALL
5. POOR APPETITE OR OVEREATING: NOT AT ALL
8. MOVING OR SPEAKING SO SLOWLY THAT OTHER PEOPLE COULD HAVE NOTICED. OR THE OPPOSITE, BEING SO FIGETY OR RESTLESS THAT YOU HAVE BEEN MOVING AROUND A LOT MORE THAN USUAL: NOT AT ALL
3. TROUBLE FALLING OR STAYING ASLEEP: NOT AT ALL
SUM OF ALL RESPONSES TO PHQ QUESTIONS 1-9: 7

## 2025-01-23 NOTE — PROGRESS NOTES
1955    Chief Complaint   Patient presents with    Hypertension    Hyperlipidemia    aneurysm      Ascending aorta    chronic fatigue    Other     Abililty to walk is getting worse-has been using walker a couple of weeks       Pt is a 69 y.o. male who presents for a follow visit. He still endorses feeling tired. He states that he sleeps 10 hours a night and naps. He wakes up once a night to urinate. He also endorses constipation. He is currently smoking a little under a pack a day. He is workling on cutting down smoking. He is following with urology. He is supposed to be on flomax, but does not want to take.       Past Medical History:   Diagnosis Date    AAA (abdominal aortic aneurysm) (HCC)     Hydrocele     right    Hypercholesterolemia     Hypertension     Lumbosacral spinal stenosis     Tobacco abuse        Past Surgical History:   Procedure Laterality Date    DENTAL SURGERY  2013    dental    HYDROCELE EXCISION Right 10/26/2022    RIGHT HYDROCELECTOMY performed by Joseph [facility]shaw MD at [facility] OR       Current Outpatient Medications   Medication Sig Dispense Refill    amLODIPine (NORVASC) 10 MG tablet Take 1 tablet by mouth daily 90 tablet 1    lisinopril (PRINIVIL;ZESTRIL) 20 MG tablet Take 1 tablet by mouth daily 90 tablet 1    MULTIPLE VITAMIN PO Take by mouth daily       No current facility-administered medications for this visit.       Allergies   Allergen Reactions    Ciprofloxacin      All fluoroquinolones - known AAA    Levofloxacin      All fluoroquinolones - known AAA         Social History     Socioeconomic History    Marital status:      Spouse name: Not on file    Number of children: 3    Years of education: 12    Highest education level: Not on file   Occupational History    Occupation: manager   Tobacco Use    Smoking status: Every Day     Current packs/day: 1.00     Average packs/day: 1 pack/day for 53.1 years (53.1 ttl pk-yrs)     Types: Cigarettes     Start date: 1974    Smokeless

## 2025-01-23 NOTE — PATIENT INSTRUCTIONS
-Try premade meals, eggs, cottage cheese, easy protein sources  -look into [employer] meals   -Start Flomax  -Continue taking Lisinopril 20 mg daily  -Continue taking amlodipine 10 mg daily   -please get lab work done   Can go to [facility] labs:  [address] [place] [address] P: [phone]  -Stop smoking, goal is to be completely off cigarettes by next visit   -Make an appointment with Dr. Costello for colonoscopy, call and get details about a ride. - office number [phone]   -Call Dr. Dunaway to make appointment for follow up on EMG - office number [phone]

## 2025-01-27 ENCOUNTER — TELEPHONE (OUTPATIENT)
Dept: INTERNAL MEDICINE CLINIC | Age: 70
End: 2025-01-27

## 2025-01-27 ENCOUNTER — LAB (OUTPATIENT)
Dept: LAB | Age: 70
End: 2025-01-27

## 2025-01-27 DIAGNOSIS — R53.82 CHRONIC FATIGUE: ICD-10-CM

## 2025-01-27 DIAGNOSIS — I10 PRIMARY HYPERTENSION: ICD-10-CM
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
## 2025-01-27 DIAGNOSIS — R63.4 WEIGHT LOSS: ICD-10-CM

## 2025-01-27 DIAGNOSIS — R26.9 GAIT DISTURBANCE: Primary | ICD-10-CM

## 2025-01-27 LAB
ALBUMIN SERPL BCG-MCNC: 4.3 G/DL (ref 3.5–5.1)
DEPRECATED RDW RBC AUTO: 45.9 FL (ref 35–45)
ERYTHROCYTE [DISTWIDTH] IN BLOOD BY AUTOMATED COUNT: 12.8 % (ref 11.5–14.5)
HCT VFR BLD AUTO: 46.1 % (ref 42–52)
HGB BLD-MCNC: 15.1 GM/DL (ref 14–18)
MCH RBC QN AUTO: 32.3 PG (ref 26–33)
MCHC RBC AUTO-ENTMCNC: 32.8 GM/DL (ref 32.2–35.5)
MCV RBC AUTO: 98.7 FL (ref 80–94)
PLATELET # BLD AUTO: 262 THOU/MM3 (ref 130–400)
PMV BLD AUTO: 12.1 FL (ref 9.4–12.4)
RBC # BLD AUTO: 4.67 MILL/MM3 (ref 4.7–6.1)
T4 FREE SERPL-MCNC: 1.35 NG/DL (ref 0.93–1.68)
TSH SERPL DL<=0.005 MIU/L-ACNC: 1.08 UIU/ML (ref 0.4–4.2)
WBC # BLD AUTO: 9.1 THOU/MM3 (ref 4.8–10.8)

## 2025-01-27 RX ORDER — AMLODIPINE BESYLATE 10 MG/1
10 TABLET ORAL DAILY
Qty: 90 TABLET | Refills: 1 | OUTPATIENT
Start: 2025-01-27

## 2025-02-04 ENCOUNTER — HOSPITAL ENCOUNTER (OUTPATIENT)
Dept: PHYSICAL THERAPY | Age: 70
Setting detail: THERAPIES SERIES
Discharge: HOME | End: 2025-02-04
Payer: MEDICARE

## 2025-02-04 PROCEDURE — 97110 THERAPEUTIC EXERCISES: CPT

## 2025-02-04 PROCEDURE — 97161 PT EVAL LOW COMPLEX 20 MIN: CPT

## 2025-02-04 NOTE — PROGRESS NOTES
[facility]  PHYSICAL THERAPY  [x] EVALUATION  [] DAILY NOTE (LAND) [] DAILY NOTE (AQUATIC ) [] PROGRESS NOTE [] DISCHARGE NOTE    [x] OUTPATIENT REHABILITATION CENTER [facility]   [] [place] [facility]    [] [facility]   [facility]    Date: 2025  Patient Name:  Jaydon Kramer  : 1955  MRN: [id number]  CSN: [id number]    Referring Practitioner Conchis Miner MD [id number]      Diagnosis  Diagnoses       R26.9 (ICD-10-CM) - Unspecified abnormalities of gait and mobility    X19.XXXS (ICD-10-CM) - Contact with other heat and hot substances, sequela           Treatment Diagnosis R26.81  Unsteadiness on feet  R26.0  Ataxic Gait  R53.1 Weakness   Date of Evaluation 25   Additional Pertinent History Jaydon Kramer has a past medical history of AAA (abdominal aortic aneurysm) (HCC), Hydrocele, Hypercholesterolemia, Hypertension, Lumbosacral spinal stenosis, and Tobacco abuse.  he has a past surgical history that includes Dental surgery () and Hydrocele surgery (Right, 10/26/2022).     Allergies Allergies   Allergen Reactions    Ciprofloxacin      All fluoroquinolones - known AAA    Levofloxacin      All fluoroquinolones - known AAA        Medications   Current Outpatient Medications:     amLODIPine (NORVASC) 10 MG tablet, Take 1 tablet by mouth daily, Disp: 90 tablet, Rfl: 1    lisinopril (PRINIVIL;ZESTRIL) 20 MG tablet, Take 1 tablet by mouth daily, Disp: 90 tablet, Rfl: 1    MULTIPLE VITAMIN PO, Take by mouth daily, Disp: , Rfl:       Functional Outcome Measure Used Parker    Functional Outcome Score 32/56 (25)       Insurance: Primary: Payor: T MEDICARE /  /  / ,   Secondary: Medicaid   Authorization Information PRE CERTIFICATION REQUIRED: Additional authorization not required.  INSURANCE THERAPY BENEFIT: Visits approved based on medical necessity.. .   AQUATIC THERAPY COVERED: Yes  MODALITIES COVERED:  Yes  $40 copay/visit   Initial CPT Codes

## 2025-02-06 ENCOUNTER — HOSPITAL ENCOUNTER (OUTPATIENT)
Dept: PHYSICAL THERAPY | Age: 70
Setting detail: THERAPIES SERIES
Discharge: HOME | End: 2025-02-06
Payer: MEDICARE

## 2025-02-06 PROCEDURE — 97110 THERAPEUTIC EXERCISES: CPT

## 2025-02-06 PROCEDURE — 97116 GAIT TRAINING THERAPY: CPT

## 2025-02-06 PROCEDURE — 97112 NEUROMUSCULAR REEDUCATION: CPT

## 2025-02-06 NOTE — PROGRESS NOTES
[facility]  PHYSICAL THERAPY  [] EVALUATION  [x] DAILY NOTE (LAND) [] DAILY NOTE (AQUATIC ) [] PROGRESS NOTE [] DISCHARGE NOTE    [x] OUTPATIENT REHABILITATION CENTER [facility]   [] [place] [facility]    [] [facility]   [] [facility]    Date: 2025  Patient Name:  Jaydon Kramer  : 1955  MRN: [id number]  CSN: [id number]    Referring Practitioner Conchis Miner MD [id number]      Diagnosis  Diagnoses       R26.9 (ICD-10-CM) - Unspecified abnormalities of gait and mobility    X19.XXXS (ICD-10-CM) - Contact with other heat and hot substances, sequela           Treatment Diagnosis R26.81  Unsteadiness on feet  R26.0  Ataxic Gait  R53.1 Weakness   Date of Evaluation 25   Additional Pertinent History Jaydon Kramer has a past medical history of AAA (abdominal aortic aneurysm) (HCC), Hydrocele, Hypercholesterolemia, Hypertension, Lumbosacral spinal stenosis, and Tobacco abuse.  he has a past surgical history that includes Dental surgery () and Hydrocele surgery (Right, 10/26/2022).     Allergies Allergies   Allergen Reactions    Ciprofloxacin      All fluoroquinolones - known AAA    Levofloxacin      All fluoroquinolones - known AAA        Medications   Current Outpatient Medications:     amLODIPine (NORVASC) 10 MG tablet, Take 1 tablet by mouth daily, Disp: 90 tablet, Rfl: 1    lisinopril (PRINIVIL;ZESTRIL) 20 MG tablet, Take 1 tablet by mouth daily, Disp: 90 tablet, Rfl: 1    MULTIPLE VITAMIN PO, Take by mouth daily, Disp: , Rfl:       Functional Outcome Measure Used Parker    Functional Outcome Score 32/56 (25)       Insurance: Primary: Payor: [employer] /  /  / ,   Secondary: Medicaid   Authorization Information PRE CERTIFICATION REQUIRED: Additional authorization not required.  INSURANCE THERAPY BENEFIT: Visits approved based on medical necessity.. .   AQUATIC THERAPY COVERED: Yes  MODALITIES COVERED:  Yes  $40 copay/visit   Initial CPT Codes

## 2025-02-07 ENCOUNTER — TRANSCRIBE ORDERS (OUTPATIENT)
Dept: ADMINISTRATIVE | Age: 70
End: 2025-02-07

## 2025-02-07 DIAGNOSIS — M50.322 OTHER CERVICAL DISC DEGENERATION AT C5-C6 LEVEL: ICD-10-CM

## 2025-02-07 DIAGNOSIS — M50.323 OTHER CERVICAL DISC DEGENERATION AT C6-C7 LEVEL: ICD-10-CM

## 2025-02-07 DIAGNOSIS — R26.81 UNSTEADY GAIT: ICD-10-CM

## 2025-02-07 DIAGNOSIS — M48.02 SPINAL STENOSIS IN CERVICAL REGION: ICD-10-CM

## 2025-02-07 DIAGNOSIS — G95.9 CERVICAL MYELOPATHY (HCC): Primary | ICD-10-CM

## 2025-02-11 ENCOUNTER — HOSPITAL ENCOUNTER (OUTPATIENT)
Dept: PHYSICAL THERAPY | Age: 70
Setting detail: THERAPIES SERIES
Discharge: HOME | End: 2025-02-11
Payer: MEDICARE

## 2025-02-11 PROCEDURE — 97110 THERAPEUTIC EXERCISES: CPT

## 2025-02-11 PROCEDURE — 97116 GAIT TRAINING THERAPY: CPT

## 2025-02-11 NOTE — PROGRESS NOTES
Requested 25293 - Therapeutic Exercise, 47546 - Neuromuscular Re-Education  and 20860 - Gait Training   Progress Note Counter 2/10 for progress note (Reporting Period: 2/4/25 to     )   Recertification Date 04/29/25   Physician Follow-Up 4/24/24   Physician Orders    History of Present Illness Pt reports 2 years ago, he was working outside and thought he overdid it the next morning when he woke up with pain in medial knee. Pt went to chiropractor who lasered it and got it feeling better. Pt continued to work. Later in time, he fell and landed on right side while at [employer]. Pt went to ED and no fracture found. Pain eventually went away but he has difficulty using it. Pt has varied between walker and cane, but currently using [facility]. Pt denies recent falls.       SUBJECTIVE: Patient presents today with a standard walker, Pt notes a walker was recommended to him at his previous visit. Pt notes that he mostly uses his QC, brought his walker for training. Pt notes that his previous session went well. Pt notes compliance c HEP, also going well.       TREATMENT   Precautions:    Pain: Denies    \"X” in shaded column indicates activity completed today    “*\" next to exercise/intervention indicates progression   Modalities Parameters/  Location  Notes                     Manual Therapy Time/Technique  Notes                     Exercise/Intervention   Notes   Nustep 5 min  L3 X           Seated:       March 10      LAQ 10x5 sec      Resisted hamstring curl 3x3 sec [place]     Heel toe raises 10             Standing:        HR/TR 10      Marches 10      Feet together EO 2x30s   No UE   Step stance- B lead  2x30s ea   No UE   Alt step taps  2x10 ea  4 inch  X 1 UE   Lateral step tap  2x10 ea R/L  4 inch  X 1 UE   Dynamic gait: slow walking marches, side stepping 2 laps ea  // bars   B BUE   Walking in // bars  5 laps    Cues for gait mechanics and posture          Gait training with Front Wheeled Walker *   X    Gait training

## 2025-02-13 ENCOUNTER — TELEPHONE (OUTPATIENT)
Dept: INTERNAL MEDICINE CLINIC | Age: 70
End: 2025-02-13

## 2025-02-13 ENCOUNTER — APPOINTMENT (OUTPATIENT)
Dept: CT IMAGING | Age: 70
End: 2025-02-13
Payer: MEDICARE

## 2025-02-13 ENCOUNTER — HOSPITAL ENCOUNTER (OUTPATIENT)
Dept: CT IMAGING | Age: 70
Discharge: HOME | End: 2025-02-13
Payer: MEDICARE

## 2025-02-13 DIAGNOSIS — I71.21 ANEURYSM OF ASCENDING AORTA WITHOUT RUPTURE (HCC): ICD-10-CM

## 2025-02-13 DIAGNOSIS — R26.9 GAIT DISTURBANCE: ICD-10-CM

## 2025-02-13 DIAGNOSIS — R26.9 GAIT DISTURBANCE: Primary | ICD-10-CM

## 2025-02-13 LAB — POC CREATININE WHOLE BLOOD: 0.9 MG/DL (ref 0.5–1.2)

## 2025-02-13 PROCEDURE — 6360000004 HC RX CONTRAST MEDICATION: Performed by: PHYSICIAN ASSISTANT

## 2025-02-13 PROCEDURE — 71275 CT ANGIOGRAPHY CHEST: CPT

## 2025-02-13 PROCEDURE — 82565 ASSAY OF CREATININE: CPT

## 2025-02-13 RX ORDER — IOPAMIDOL 755 MG/ML
80 INJECTION, SOLUTION INTRAVASCULAR
Status: COMPLETED | OUTPATIENT
Start: 2025-02-13 | End: 2025-02-13

## 2025-02-13 RX ADMIN — IOPAMIDOL 80 ML: 755 INJECTION, SOLUTION INTRAVENOUS at 14:20

## 2025-02-13 NOTE — TELEPHONE ENCOUNTER
Patient requesting order foe adjustable wheeled walker with slides . Order placed and woll fax to [facility] .

## 2025-02-14 ENCOUNTER — HOSPITAL ENCOUNTER (OUTPATIENT)
Dept: PHYSICAL THERAPY | Age: 70
Setting detail: THERAPIES SERIES
Discharge: HOME | End: 2025-02-14
Payer: MEDICARE

## 2025-02-14 ENCOUNTER — TELEPHONE (OUTPATIENT)
Dept: CARDIOTHORACIC SURGERY | Age: 70
End: 2025-02-14

## 2025-02-14 PROCEDURE — 97530 THERAPEUTIC ACTIVITIES: CPT

## 2025-02-14 NOTE — TELEPHONE ENCOUNTER
Patient had CTA chest w/wo contrast on 02/13/2025. Patient is currently scheduled with Dr. Aquino for 03/06/2025 for a follow up.     Ok to keep patient's appointment on this day, but have the patient see a PA? Please advise, thank you.

## 2025-02-14 NOTE — PROGRESS NOTES
[facility]  PHYSICAL THERAPY  [] EVALUATION  [x] DAILY NOTE (LAND) [] DAILY NOTE (AQUATIC ) [] PROGRESS NOTE [] DISCHARGE NOTE    [x] OUTPATIENT REHABILITATION CENTER [facility]   [] [place] [facility]    [] [facility]   [facility]    Date: 2025  Patient Name:  Jaydon Kramer  : 1955  MRN: [id number]  CSN: [id number]    Referring Practitioner Conchis Miner MD [id number]      Diagnosis  Diagnoses       R26.9 (ICD-10-CM) - Unspecified abnormalities of gait and mobility    X19.XXXS (ICD-10-CM) - Contact with other heat and hot substances, sequela           Treatment Diagnosis R26.81  Unsteadiness on feet  R26.0  Ataxic Gait  R53.1 Weakness   Date of Evaluation 25   Additional Pertinent History Jaydon Kramer has a past medical history of AAA (abdominal aortic aneurysm) (HCC), Hydrocele, Hypercholesterolemia, Hypertension, Lumbosacral spinal stenosis, and Tobacco abuse.  he has a past surgical history that includes Dental surgery () and Hydrocele surgery (Right, 10/26/2022).     Allergies Allergies   Allergen Reactions    Ciprofloxacin      All fluoroquinolones - known AAA    Levofloxacin      All fluoroquinolones - known AAA        Medications   Current Outpatient Medications:     Misc. Devices (ROLLER WALKER) MISC, 1 each by Does not apply route daily Adjustable folding wheeled walker with slides, Disp: 1 each, Rfl: 0    amLODIPine (NORVASC) 10 MG tablet, Take 1 tablet by mouth daily, Disp: 90 tablet, Rfl: 1    lisinopril (PRINIVIL;ZESTRIL) 20 MG tablet, Take 1 tablet by mouth daily, Disp: 90 tablet, Rfl: 1    MULTIPLE VITAMIN PO, Take by mouth daily, Disp: , Rfl:       Functional Outcome Measure Used Parker    Functional Outcome Score 32/56 (25)       Insurance: Primary: Payor: [employer] MEDICARE /  /  / ,   [place]   Authorization Information PRE CERTIFICATION REQUIRED: Additional authorization not required.  INSURANCE

## 2025-02-17 ENCOUNTER — HOSPITAL ENCOUNTER (OUTPATIENT)
Dept: PHYSICAL THERAPY | Age: 70
Setting detail: THERAPIES SERIES
Discharge: HOME | End: 2025-02-17
Payer: MEDICARE

## 2025-02-17 PROCEDURE — 97110 THERAPEUTIC EXERCISES: CPT

## 2025-02-17 PROCEDURE — 97112 NEUROMUSCULAR REEDUCATION: CPT

## 2025-02-17 NOTE — PROGRESS NOTES
[facility]  PHYSICAL THERAPY  [] EVALUATION  [x] DAILY NOTE (LAND) [] DAILY NOTE (AQUATIC ) [] PROGRESS NOTE [] DISCHARGE NOTE    [x] OUTPATIENT REHABILITATION CENTER [facility]   [] [place] [facility]    [] [facility]   [facility]    Date: 2025  Patient Name:  Jaydon Kramer  : 1955  MRN: [id number]  CSN: [id number]    Referring Practitioner Conchis Miner MD [id number]      Diagnosis  Diagnoses       R26.9 (ICD-10-CM) - Unspecified abnormalities of gait and mobility    X19.XXXS (ICD-10-CM) - Contact with other heat and hot substances, sequela           Treatment Diagnosis R26.81  Unsteadiness on feet  R26.0  Ataxic Gait  R53.1 Weakness   Date of Evaluation 25   Additional Pertinent History Jaydon Kramer has a past medical history of AAA (abdominal aortic aneurysm) (HCC), Hydrocele, Hypercholesterolemia, Hypertension, Lumbosacral spinal stenosis, and Tobacco abuse.  he has a past surgical history that includes Dental surgery () and Hydrocele surgery (Right, 10/26/2022).     Allergies Allergies   Allergen Reactions    Ciprofloxacin      All fluoroquinolones - known AAA    Levofloxacin      All fluoroquinolones - known AAA        Medications   Current Outpatient Medications:     Misc. Devices (ROLLER WALKER) MISC, 1 each by Does not apply route daily Adjustable wheeled walker with slides, Disp: 1 each, Rfl: 0    Misc. Devices (ROLLER WALKER) MISC, 1 each by Does not apply route daily Adjustable folding wheeled walker with slides, Disp: 1 each, Rfl: 0    amLODIPine (NORVASC) 10 MG tablet, Take 1 tablet by mouth daily, Disp: 90 tablet, Rfl: 1    lisinopril (PRINIVIL;ZESTRIL) 20 MG tablet, Take 1 tablet by mouth daily, Disp: 90 tablet, Rfl: 1    MULTIPLE VITAMIN PO, Take by mouth daily, Disp: , Rfl:       Functional Outcome Measure Used Parker    Functional Outcome Score 32/56 (25)       Insurance: Primary: Payor: [employer] MEDICARE /  /  / ,

## 2025-02-21 ENCOUNTER — HOSPITAL ENCOUNTER (OUTPATIENT)
Dept: PHYSICAL THERAPY | Age: 70
Setting detail: THERAPIES SERIES
Discharge: HOME | End: 2025-02-21
Payer: MEDICARE

## 2025-02-21 PROCEDURE — 97110 THERAPEUTIC EXERCISES: CPT

## 2025-02-21 PROCEDURE — 97112 NEUROMUSCULAR REEDUCATION: CPT

## 2025-02-21 NOTE — PROGRESS NOTES
[facility]  PHYSICAL THERAPY  [] EVALUATION  [x] DAILY NOTE (LAND) [] DAILY NOTE (AQUATIC ) [] PROGRESS NOTE [] DISCHARGE NOTE    [x] OUTPATIENT REHABILITATION CENTER [facility]   [] [place] [facility]    [] [facility]   [] [facility]    Date: 2025  Patient Name:  Jaydon Kramer  : 1955  MRN: [id number]  CSN: [id number]    Referring Practitioner Conchis Miner MD [id number]      Diagnosis  Diagnoses       R26.9 (ICD-10-CM) - Unspecified abnormalities of gait and mobility    X19.XXXS (ICD-10-CM) - Contact with other heat and hot substances, sequela           Treatment Diagnosis R26.81  Unsteadiness on feet  R26.0  Ataxic Gait  R53.1 Weakness   Date of Evaluation 25   Additional Pertinent History Jaydon Kramer has a past medical history of AAA (abdominal aortic aneurysm), Hydrocele, Hypercholesterolemia, Hypertension, Lumbosacral spinal stenosis, and Tobacco abuse.  he has a past surgical history that includes Dental surgery () and Hydrocele surgery (Right, 10/26/2022).     Allergies Allergies   Allergen Reactions    Ciprofloxacin      All fluoroquinolones - known AAA    Levofloxacin      All fluoroquinolones - known AAA        Medications   Current Outpatient Medications:     Misc. Devices (ROLLER WALKER) MISC, 1 each by Does not apply route daily Adjustable wheeled walker with slides, Disp: 1 each, Rfl: 0    Misc. Devices (ROLLER WALKER) MISC, 1 each by Does not apply route daily Adjustable folding wheeled walker with slides, Disp: 1 each, Rfl: 0    amLODIPine (NORVASC) 10 MG tablet, Take 1 tablet by mouth daily, Disp: 90 tablet, Rfl: 1    lisinopril (PRINIVIL;ZESTRIL) 20 MG tablet, Take 1 tablet by mouth daily, Disp: 90 tablet, Rfl: 1    MULTIPLE VITAMIN PO, Take by mouth daily, Disp: , Rfl:       Functional Outcome Measure Used Parker    Functional Outcome Score 32/56 (25)       Insurance: Primary: Payor: [employer] MEDICARE /  /  / ,   Secondary:

## 2025-02-25 ENCOUNTER — TELEPHONE (OUTPATIENT)
Dept: CARDIOTHORACIC SURGERY | Age: 70
End: 2025-02-25

## 2025-02-25 ENCOUNTER — HOSPITAL ENCOUNTER (OUTPATIENT)
Dept: PHYSICAL THERAPY | Age: 70
Setting detail: THERAPIES SERIES
Discharge: HOME | End: 2025-02-25
Payer: MEDICARE

## 2025-02-25 PROCEDURE — 97112 NEUROMUSCULAR REEDUCATION: CPT

## 2025-02-25 PROCEDURE — 97110 THERAPEUTIC EXERCISES: CPT

## 2025-02-25 NOTE — TELEPHONE ENCOUNTER
LMOM wanting to see if patient would like to move their appointment to 02/27/2025 at 11:30 am or 03/04/2025 at 11:15 am

## 2025-02-25 NOTE — PROGRESS NOTES
[facility]  PHYSICAL THERAPY  [] EVALUATION  [x] DAILY NOTE (LAND) [] DAILY NOTE (AQUATIC ) [] PROGRESS NOTE [] DISCHARGE NOTE    [x] OUTPATIENT REHABILITATION CENTER [facility]   [] [place] [facility]    [] [facility]   [] [facility]    Date: 2025  Patient Name:  Jaydon Kramer  : 1955  MRN: [id number]  CSN: [id number]    Referring Practitioner Conchis Miner MD [id number]      Diagnosis  Diagnoses       R26.9 (ICD-10-CM) - Unspecified abnormalities of gait and mobility    X19.XXXS (ICD-10-CM) - Contact with other heat and hot substances, sequela           Treatment Diagnosis R26.81  Unsteadiness on feet  R26.0  Ataxic Gait  R53.1 Weakness   Date of Evaluation 25   Additional Pertinent History Jaydon Kramer has a past medical history of AAA (abdominal aortic aneurysm), Hydrocele, Hypercholesterolemia, Hypertension, Lumbosacral spinal stenosis, and Tobacco abuse.  he has a past surgical history that includes Dental surgery () and Hydrocele surgery (Right, 10/26/2022).     Allergies Allergies   Allergen Reactions    Ciprofloxacin      All fluoroquinolones - known AAA    Levofloxacin      All fluoroquinolones - known AAA        Medications   Current Outpatient Medications:     Misc. Devices (ROLLER WALKER) MISC, 1 each by Does not apply route daily Adjustable wheeled walker with slides, Disp: 1 each, Rfl: 0    Misc. Devices (ROLLER WALKER) MISC, 1 each by Does not apply route daily Adjustable folding wheeled walker with slides, Disp: 1 each, Rfl: 0    amLODIPine (NORVASC) 10 MG tablet, Take 1 tablet by mouth daily, Disp: 90 tablet, Rfl: 1    lisinopril (PRINIVIL;ZESTRIL) 20 MG tablet, Take 1 tablet by mouth daily, Disp: 90 tablet, Rfl: 1    MULTIPLE VITAMIN PO, Take by mouth daily, Disp: , Rfl:       Functional Outcome Measure Used Parker    Functional Outcome Score 32/56 (25)       Insurance: Primary: Payor: [employer] MEDICARE /  /  / ,   Secondary:

## 2025-02-27 ENCOUNTER — HOSPITAL ENCOUNTER (OUTPATIENT)
Dept: MRI IMAGING | Age: 70
Discharge: HOME | End: 2025-02-27
Attending: ORTHOPAEDIC SURGERY
Payer: MEDICARE

## 2025-02-27 ENCOUNTER — OFFICE VISIT (OUTPATIENT)
Dept: CARDIOTHORACIC SURGERY | Age: 70
End: 2025-02-27
Payer: MEDICARE

## 2025-02-27 VITALS
BODY MASS INDEX: 18.15 KG/M2 | WEIGHT: 149 LBS | SYSTOLIC BLOOD PRESSURE: 134 MMHG | HEIGHT: 76 IN | HEART RATE: 78 BPM | DIASTOLIC BLOOD PRESSURE: 86 MMHG

## 2025-02-27 DIAGNOSIS — M50.323 OTHER CERVICAL DISC DEGENERATION AT C6-C7 LEVEL: ICD-10-CM

## 2025-02-27 DIAGNOSIS — M50.322 OTHER CERVICAL DISC DEGENERATION AT C5-C6 LEVEL: ICD-10-CM

## 2025-02-27 DIAGNOSIS — R26.81 UNSTEADY GAIT: ICD-10-CM

## 2025-02-27 DIAGNOSIS — I71.21 ANEURYSM OF ASCENDING AORTA WITHOUT RUPTURE: Primary | ICD-10-CM

## 2025-02-27 DIAGNOSIS — M48.02 SPINAL STENOSIS IN CERVICAL REGION: ICD-10-CM

## 2025-02-27 DIAGNOSIS — G95.9 CERVICAL MYELOPATHY (HCC): ICD-10-CM

## 2025-02-27 PROCEDURE — 3079F DIAST BP 80-89 MM HG: CPT | Performed by: PHYSICIAN ASSISTANT

## 2025-02-27 PROCEDURE — 70551 MRI BRAIN STEM W/O DYE: CPT

## 2025-02-27 PROCEDURE — 72141 MRI NECK SPINE W/O DYE: CPT

## 2025-02-27 PROCEDURE — 1123F ACP DISCUSS/DSCN MKR DOCD: CPT | Performed by: PHYSICIAN ASSISTANT

## 2025-02-27 PROCEDURE — 1160F RVW MEDS BY RX/DR IN RCRD: CPT | Performed by: PHYSICIAN ASSISTANT

## 2025-02-27 PROCEDURE — 72146 MRI CHEST SPINE W/O DYE: CPT

## 2025-02-27 PROCEDURE — 99214 OFFICE O/P EST MOD 30 MIN: CPT | Performed by: PHYSICIAN ASSISTANT

## 2025-02-27 PROCEDURE — 1159F MED LIST DOCD IN RCRD: CPT | Performed by: PHYSICIAN ASSISTANT

## 2025-02-27 PROCEDURE — 3075F SYST BP GE 130 - 139MM HG: CPT | Performed by: PHYSICIAN ASSISTANT

## 2025-02-27 NOTE — PROGRESS NOTES
artery calcifications. There is no pericardial or pleural effusion. The   heart is normal in size. There is atelectasis at the lung bases. There is no pneumothorax. There is a fat-containing lesion at the left adrenal gland which measures 3 cm. 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     IMPRESSION:  1. There are no suspicious masses or nodules within the lung fields.  2. Aneurysmal dilatation of the ascending aorta which measures up to 5.3 cm. Vascular surgery consultation is recommended.  3. There are no pathologically enlarged lymph nodes.  4. LUNGRADS ASSESSMENT VALUE: 1, LUNGRADS S MODIFIER        The LUNG RADS RECOMMENDATIONS for monitoring lung nodules listed below (ACR- Lung-RADS Version 1.0 Assessment Categories)     LUNG RADS RECOMMENDATIONS;  1.  Normal, continue annual screening  2.  Benign appearance or behavior, continue annual screening  3.  6 month CT recommended  4A.  3 month CT recommended; may consider PET/CT  4B.  Additional diagnostics and/or tissue sampling recommended  4X.  Additional diagnostics and/or tissue sampling recommended       **This report has been created using voice recognition software.  It may contain minor errors which are inherent in voice recognition technology.**     Final report electronically signed by Dr Rodri Ovalles on 8/21/2023 4:48 PM    ROS:   Constitutional:  Negative for chills, fever and unexpected weight change.   Respiratory:  Negative for apnea,wheezing and stridor.    Cardiovascular:  Negative for chest pain or palpitations  Gastrointestinal: Negative for hematochezia, melana, constipation, and N/V/D.  Musculoskeletal:  R LE weakness   Skin: Negative for color change, rash and wound.   Neurological: Negative for dizziness or syncope.    Vital Signs:   /86   Pulse 78   Ht 1.93 m (6' 4\")   Wt 67.6 kg (149 lb)   BMI 18.14 kg/m²     Physical Exam:  General appearance:  No acute distress, appears stated age and cooperative.  Neck: No jugular venous

## 2025-02-28 ENCOUNTER — HOSPITAL ENCOUNTER (OUTPATIENT)
Dept: PHYSICAL THERAPY | Age: 70
Setting detail: THERAPIES SERIES
Discharge: HOME | End: 2025-02-28
Payer: MEDICARE

## 2025-02-28 PROCEDURE — 97112 NEUROMUSCULAR REEDUCATION: CPT

## 2025-02-28 PROCEDURE — 97110 THERAPEUTIC EXERCISES: CPT

## 2025-02-28 NOTE — PROGRESS NOTES
weeks  Patient will complete 5 sit to/from stand transfers from standard chair with UEs in 20 seconds to improve functional strength for climbing stairs.   Patient will ambulate without AD x50 feet without LOB for household mobility.    Patient will improve Parker score to 45/56 to decrease fall risk and improve mobility on various terrain.  Patient will be independent and compliant with HEP to achieve above goals.       Patient Education:   [x]  HEP/Education Completed: follow up on RW if haven't heard anything mid week  Breeze Access Code: [email or web address]   []  No new Education completed  []  Reviewed Prior HEP      [x]  Patient verbalized and/or demonstrated understanding of education provided.  []  Patient unable to verbalize and/or demonstrate understanding of education provided.  Will continue education.  []  Barriers to learning:     PLAN:  Treatment Recommendations: Strengthening, Balance Training, Transfer Training, Gait Training, Stair Training, Neuromuscular Re-education, Home Exercise Program, and Patient Education    []  Plan of care initiated.  Plan to see patient 2 times per week for 12 weeks to address the treatment planned outlined above.  [x]  Continue with current plan of care  []  Modify plan of care as follows:    []  Hold pending physician visit  []  Discharge    Time In 1421   Time Out 1504   Timed Code Minutes: 43   Total Treatment Time: 43       Electronically Signed by: Lewis Lemus PT

## 2025-03-04 ENCOUNTER — HOSPITAL ENCOUNTER (OUTPATIENT)
Dept: PHYSICAL THERAPY | Age: 70
Setting detail: THERAPIES SERIES
Discharge: HOME | End: 2025-03-04
Payer: MEDICARE

## 2025-03-04 PROCEDURE — 97112 NEUROMUSCULAR REEDUCATION: CPT

## 2025-03-04 PROCEDURE — 97110 THERAPEUTIC EXERCISES: CPT

## 2025-03-07 ENCOUNTER — HOSPITAL ENCOUNTER (OUTPATIENT)
Dept: PHYSICAL THERAPY | Age: 70
Setting detail: THERAPIES SERIES
Discharge: HOME | End: 2025-03-07
Payer: MEDICARE

## 2025-03-07 PROCEDURE — 97530 THERAPEUTIC ACTIVITIES: CPT

## 2025-03-07 NOTE — PROGRESS NOTES
[facility]  PHYSICAL THERAPY  [] EVALUATION  [x] DAILY NOTE (LAND) [] DAILY NOTE (AQUATIC ) [] PROGRESS NOTE [] DISCHARGE NOTE    [x] OUTPATIENT REHABILITATION CENTER [facility]   [] [place] [facility]    [] [facility]   [facility]    Date: 3/7/2025  Patient Name:  Jaydon Kramer  : 1955  MRN: [id number]  CSN: [id number]    Referring Practitioner Conchis Miner MD [id number]      Diagnosis  Diagnoses       R26.9 (ICD-10-CM) - Unspecified abnormalities of gait and mobility    X19.XXXS (ICD-10-CM) - Contact with other heat and hot substances, sequela           Treatment Diagnosis R26.81  Unsteadiness on feet  R26.0  Ataxic Gait  R53.1 Weakness   Date of Evaluation 25   Additional Pertinent History Jaydon Kramer has a past medical history of AAA (abdominal aortic aneurysm), Hydrocele, Hypercholesterolemia, Hypertension, Lumbosacral spinal stenosis, and Tobacco abuse.  he has a past surgical history that includes Dental surgery () and Hydrocele surgery (Right, 10/26/2022).     Allergies Allergies   Allergen Reactions    Ciprofloxacin      All fluoroquinolones - known AAA    Levofloxacin      All fluoroquinolones - known AAA        Medications   Current Outpatient Medications:     Misc. Devices (ROLLER WALKER) MISC, 1 each by Does not apply route daily Adjustable wheeled walker with slides, Disp: 1 each, Rfl: 0    Misc. Devices (ROLLER WALKER) MISC, 1 each by Does not apply route daily Adjustable folding wheeled walker with slides, Disp: 1 each, Rfl: 0    amLODIPine (NORVASC) 10 MG tablet, Take 1 tablet by mouth daily, Disp: 90 tablet, Rfl: 1    lisinopril (PRINIVIL;ZESTRIL) 20 MG tablet, Take 1 tablet by mouth daily, Disp: 90 tablet, Rfl: 1    MULTIPLE VITAMIN PO, Take by mouth daily, Disp: , Rfl:       Functional Outcome Measure Used Parker    Functional Outcome Score 32/56 (25)       Insurance: Primary: Payor: [employer] MEDICARE /  /  / ,   Secondary:

## 2025-03-11 ENCOUNTER — HOSPITAL ENCOUNTER (OUTPATIENT)
Dept: PHYSICAL THERAPY | Age: 70
Setting detail: THERAPIES SERIES
Discharge: HOME | End: 2025-03-11
Payer: MEDICARE

## 2025-03-11 PROCEDURE — 97110 THERAPEUTIC EXERCISES: CPT

## 2025-03-11 PROCEDURE — 97112 NEUROMUSCULAR REEDUCATION: CPT

## 2025-03-11 NOTE — PROGRESS NOTES
[facility]  PHYSICAL THERAPY  [] EVALUATION  [x] DAILY NOTE (LAND) [] DAILY NOTE (AQUATIC ) [] PROGRESS NOTE [] DISCHARGE NOTE    [x] OUTPATIENT REHABILITATION CENTER [facility]   [] [place] [facility]    [] [facility]   [facility]    Date: 3/11/2025  Patient Name:  Jaydon Kramer  : 1955  MRN: [id number]  CSN: [id number]    Referring Practitioner Conchis Miner MD [id number]      Diagnosis  Diagnoses       R26.9 (ICD-10-CM) - Unspecified abnormalities of gait and mobility    X19.XXXS (ICD-10-CM) - Contact with other heat and hot substances, sequela           Treatment Diagnosis R26.81  Unsteadiness on feet  R26.0  Ataxic Gait  R53.1 Weakness   Date of Evaluation 25   Additional Pertinent History Jaydon Kramer has a past medical history of AAA (abdominal aortic aneurysm), Hydrocele, Hypercholesterolemia, Hypertension, Lumbosacral spinal stenosis, and Tobacco abuse.  he has a past surgical history that includes Dental surgery () and Hydrocele surgery (Right, 10/26/2022).     Allergies Allergies   Allergen Reactions    Ciprofloxacin      All fluoroquinolones - known AAA    Levofloxacin      All fluoroquinolones - known AAA        Medications   Current Outpatient Medications:     Misc. Devices (ROLLER WALKER) MISC, 1 each by Does not apply route daily Adjustable wheeled walker with slides, Disp: 1 each, Rfl: 0    Misc. Devices (ROLLER WALKER) MISC, 1 each by Does not apply route daily Adjustable folding wheeled walker with slides, Disp: 1 each, Rfl: 0    amLODIPine (NORVASC) 10 MG tablet, Take 1 tablet by mouth daily, Disp: 90 tablet, Rfl: 1    lisinopril (PRINIVIL;ZESTRIL) 20 MG tablet, Take 1 tablet by mouth daily, Disp: 90 tablet, Rfl: 1    MULTIPLE VITAMIN PO, Take by mouth daily, Disp: , Rfl:       Functional Outcome Measure Used Parker    Functional Outcome Score 32/56 (25)       Insurance: Primary: Payor: [employer] MEDICARE /  /  / ,   Secondary:

## 2025-03-14 ENCOUNTER — HOSPITAL ENCOUNTER (OUTPATIENT)
Dept: PHYSICAL THERAPY | Age: 70
Setting detail: THERAPIES SERIES
Discharge: HOME | End: 2025-03-14
Payer: MEDICARE

## 2025-03-14 PROCEDURE — 97112 NEUROMUSCULAR REEDUCATION: CPT

## 2025-03-14 PROCEDURE — 97110 THERAPEUTIC EXERCISES: CPT

## 2025-03-14 NOTE — PROGRESS NOTES
[facility]  PHYSICAL THERAPY  [] EVALUATION  [x] DAILY NOTE (LAND) [] DAILY NOTE (AQUATIC ) [] PROGRESS NOTE [] DISCHARGE NOTE    [x] OUTPATIENT REHABILITATION CENTER [facility]   [] [place] [facility]    [] [facility]   [] [facility]    Date: 3/14/2025  Patient Name:  Jaydon Kramer  : 1955  MRN: [id number]  CSN: [id number]    Referring Practitioner Conchis Miner MD [id number]      Diagnosis  Diagnoses       R26.9 (ICD-10-CM) - Unspecified abnormalities of gait and mobility    X19.XXXS (ICD-10-CM) - Contact with other heat and hot substances, sequela           Treatment Diagnosis R26.81  Unsteadiness on feet  R26.0  Ataxic Gait  R53.1 Weakness   Date of Evaluation 25   Additional Pertinent History Jaydon Kramer has a past medical history of AAA (abdominal aortic aneurysm), Hydrocele, Hypercholesterolemia, Hypertension, Lumbosacral spinal stenosis, and Tobacco abuse.  he has a past surgical history that includes Dental surgery () and Hydrocele surgery (Right, 10/26/2022).     Allergies Allergies   Allergen Reactions    Ciprofloxacin      All fluoroquinolones - known AAA    Levofloxacin      All fluoroquinolones - known AAA        Medications   Current Outpatient Medications:     Misc. Devices (ROLLER WALKER) MISC, 1 each by Does not apply route daily Adjustable wheeled walker with slides, Disp: 1 each, Rfl: 0    Misc. Devices (ROLLER WALKER) MISC, 1 each by Does not apply route daily Adjustable folding wheeled walker with slides, Disp: 1 each, Rfl: 0    amLODIPine (NORVASC) 10 MG tablet, Take 1 tablet by mouth daily, Disp: 90 tablet, Rfl: 1    lisinopril (PRINIVIL;ZESTRIL) 20 MG tablet, Take 1 tablet by mouth daily, Disp: 90 tablet, Rfl: 1    MULTIPLE VITAMIN PO, Take by mouth daily, Disp: , Rfl:       Functional Outcome Measure Used Parker    Functional Outcome Score 32/56 (25)       Insurance: Primary: Payor: [employer] /  /  / ,   Secondary:

## 2025-03-18 ENCOUNTER — HOSPITAL ENCOUNTER (OUTPATIENT)
Dept: PHYSICAL THERAPY | Age: 70
Setting detail: THERAPIES SERIES
Discharge: HOME | End: 2025-03-18
Payer: MEDICARE

## 2025-03-18 PROCEDURE — 97110 THERAPEUTIC EXERCISES: CPT

## 2025-03-18 NOTE — PROGRESS NOTES
[facility]  PHYSICAL THERAPY  [] EVALUATION  [x] DAILY NOTE (LAND) [] DAILY NOTE (AQUATIC ) [] PROGRESS NOTE [] DISCHARGE NOTE    [x] OUTPATIENT REHABILITATION CENTER [facility]   [] [place] [facility]    [] [facility]   [] [facility]    Date: 3/18/2025  Patient Name:  Jaydon Kramer  : 1955  MRN: [id number]  CSN: [id number]    Referring Practitioner Conchis Miner MD [id number]      Diagnosis  Diagnoses       R26.9 (ICD-10-CM) - Unspecified abnormalities of gait and mobility    X19.XXXS (ICD-10-CM) - Contact with other heat and hot substances, sequela           Treatment Diagnosis R26.81  Unsteadiness on feet  R26.0  Ataxic Gait  R53.1 Weakness   Date of Evaluation 25   Additional Pertinent History Jaydon Kramer has a past medical history of AAA (abdominal aortic aneurysm), Hydrocele, Hypercholesterolemia, Hypertension, Lumbosacral spinal stenosis, and Tobacco abuse.  he has a past surgical history that includes Dental surgery () and Hydrocele surgery (Right, 10/26/2022).     Allergies Allergies   Allergen Reactions    Ciprofloxacin      All fluoroquinolones - known AAA    Levofloxacin      All fluoroquinolones - known AAA        Medications   Current Outpatient Medications:     Misc. Devices (ROLLER WALKER) MISC, 1 each by Does not apply route daily Adjustable wheeled walker with slides, Disp: 1 each, Rfl: 0    Misc. Devices (ROLLER WALKER) MISC, 1 each by Does not apply route daily Adjustable folding wheeled walker with slides, Disp: 1 each, Rfl: 0    amLODIPine (NORVASC) 10 MG tablet, Take 1 tablet by mouth daily, Disp: 90 tablet, Rfl: 1    lisinopril (PRINIVIL;ZESTRIL) 20 MG tablet, Take 1 tablet by mouth daily, Disp: 90 tablet, Rfl: 1    MULTIPLE VITAMIN PO, Take by mouth daily, Disp: , Rfl:       Functional Outcome Measure Used Parker    Functional Outcome Score 32/56 (25)       Insurance: Primary: Payor: [employer] /  /  / ,   Secondary:

## 2025-03-21 ENCOUNTER — HOSPITAL ENCOUNTER (OUTPATIENT)
Dept: PHYSICAL THERAPY | Age: 70
Setting detail: THERAPIES SERIES
Discharge: HOME | End: 2025-03-21
Payer: MEDICARE

## 2025-03-21 PROCEDURE — 97110 THERAPEUTIC EXERCISES: CPT

## 2025-03-21 NOTE — PROGRESS NOTES
to 45/56 to decrease fall risk and improve mobility on various terrain.   Patient will be independent and compliant with HEP to achieve above goals.       Patient Education:   [x]  HEP/Education Completed: heel strike with gait, quad activation when in R stance  Pay[employer] Access Code: [email or web address]   []  No new Education completed  []  Reviewed Prior HEP      [x]  Patient verbalized and/or demonstrated understanding of education provided.  []  Patient unable to verbalize and/or demonstrate understanding of education provided.  Will continue education.  []  Barriers to learning:     PLAN:  Treatment Recommendations: Strengthening, Balance Training, Transfer Training, Gait Training, Stair Training, Neuromuscular Re-education, Home Exercise Program, and Patient Education    []  Plan of care initiated.  Plan to see patient 2 times per week for 12 weeks to address the treatment planned outlined above.  [x]  Continue with current plan of care  []  Modify plan of care as follows:    []  Hold pending physician visit  []  Discharge    Time In 1346   Time Out 1427   Timed Code Minutes: 41   Total Treatment Time: 41       Electronically Signed by: Yulia Maloney PT

## 2025-03-25 ENCOUNTER — HOSPITAL ENCOUNTER (OUTPATIENT)
Dept: PHYSICAL THERAPY | Age: 70
Setting detail: THERAPIES SERIES
Discharge: HOME | End: 2025-03-25
Payer: MEDICARE

## 2025-03-25 PROCEDURE — 97112 NEUROMUSCULAR REEDUCATION: CPT

## 2025-03-25 PROCEDURE — 97110 THERAPEUTIC EXERCISES: CPT

## 2025-03-25 NOTE — PROGRESS NOTES
[facility]  PHYSICAL THERAPY  [] EVALUATION  [x] DAILY NOTE (LAND) [] DAILY NOTE (AQUATIC ) [] PROGRESS NOTE [] DISCHARGE NOTE    [x] OUTPATIENT REHABILITATION CENTER [facility]   [] [place] [facility]    [] [facility]   [] [facility]    Date: 3/25/2025  Patient Name:  Jaydon Kramer  : 1955  MRN: [id number]  CSN: [id number]    Referring Practitioner Conchis Miner MD [id number]      Diagnosis  Diagnoses       R26.9 (ICD-10-CM) - Unspecified abnormalities of gait and mobility    X19.XXXS (ICD-10-CM) - Contact with other heat and hot substances, sequela           Treatment Diagnosis R26.81  Unsteadiness on feet  R26.0  Ataxic Gait  R53.1 Weakness   Date of Evaluation 25   Additional Pertinent History Jaydon Kramer has a past medical history of AAA (abdominal aortic aneurysm), Hydrocele, Hypercholesterolemia, Hypertension, Lumbosacral spinal stenosis, and Tobacco abuse.  he has a past surgical history that includes Dental surgery () and Hydrocele surgery (Right, 10/26/2022).     Allergies Allergies   Allergen Reactions    Ciprofloxacin      All fluoroquinolones - known AAA    Levofloxacin      All fluoroquinolones - known AAA        Medications   Current Outpatient Medications:     Misc. Devices (ROLLER WALKER) MISC, 1 each by Does not apply route daily Adjustable wheeled walker with slides, Disp: 1 each, Rfl: 0    Misc. Devices (ROLLER WALKER) MISC, 1 each by Does not apply route daily Adjustable folding wheeled walker with slides, Disp: 1 each, Rfl: 0    amLODIPine (NORVASC) 10 MG tablet, Take 1 tablet by mouth daily, Disp: 90 tablet, Rfl: 1    lisinopril (PRINIVIL;ZESTRIL) 20 MG tablet, Take 1 tablet by mouth daily, Disp: 90 tablet, Rfl: 1    MULTIPLE VITAMIN PO, Take by mouth daily, Disp: , Rfl:       Functional Outcome Measure Used Parker    Functional Outcome Score 32/56 (25)       Insurance: Primary: Payor: [employer] /  /  / ,   Secondary: 
 Symptoms

## 2025-03-27 ENCOUNTER — OFFICE VISIT (OUTPATIENT)
Dept: UROLOGY | Age: 70
End: 2025-03-27
Payer: MEDICARE

## 2025-03-27 VITALS — HEIGHT: 76 IN | RESPIRATION RATE: 18 BRPM | BODY MASS INDEX: 18.27 KG/M2 | WEIGHT: 150 LBS

## 2025-03-27 DIAGNOSIS — N39.41 URINARY INCONTINENCE, URGE: ICD-10-CM

## 2025-03-27 DIAGNOSIS — N40.0 BENIGN PROSTATIC HYPERPLASIA WITHOUT LOWER URINARY TRACT SYMPTOMS: Primary | ICD-10-CM

## 2025-03-27 DIAGNOSIS — N32.81 OAB (OVERACTIVE BLADDER): ICD-10-CM

## 2025-03-27 LAB
BILIRUBIN, URINE: NEGATIVE
BLOOD URINE, POC: NEGATIVE
CHARACTER, URINE: CLEAR
COLOR, UA: YELLOW
GLUCOSE URINE: NEGATIVE MG/DL
KETONES, URINE: NEGATIVE
LEUKOCYTE CLUMPS, URINE: ABNORMAL
NITRITE, URINE: NEGATIVE
PH, URINE: 6.5 (ref 5–9)
POST VOID RESIDUAL (PVR): 249 ML
PROTEIN, URINE: NEGATIVE MG/DL
SPECIFIC GRAVITY UA: >= 1.03 (ref 1–1.03)
UROBILINOGEN, URINE: 0.2 EU/DL (ref 0–1)

## 2025-03-27 PROCEDURE — 51798 US URINE CAPACITY MEASURE: CPT

## 2025-03-27 PROCEDURE — 99213 OFFICE O/P EST LOW 20 MIN: CPT

## 2025-03-27 PROCEDURE — 1159F MED LIST DOCD IN RCRD: CPT

## 2025-03-27 PROCEDURE — 81003 URINALYSIS AUTO W/O SCOPE: CPT

## 2025-03-27 PROCEDURE — 1123F ACP DISCUSS/DSCN MKR DOCD: CPT

## 2025-03-27 RX ORDER — MIRABEGRON 50 MG/1
50 TABLET, FILM COATED, EXTENDED RELEASE ORAL DAILY
Qty: 90 TABLET | Refills: 1 | Status: SHIPPED | OUTPATIENT
Start: 2025-03-27

## 2025-03-27 NOTE — PROGRESS NOTES
[facility] [place] [facility]  [facility] UROLOGY  [address]  [address]  [facility] [address]  Dept: [phone]  Loc: [phone]  Visit Date: 3/27/2025      JESSICA Kramer is a 69 y.o. male that presents to the urology clinic for urinary urgency follow-up.    Urinary Urgency/Frequency  Worsening LUTS. Primarily urgency and frequency. Prescribed Oxybutynin at last visit which patient did not take it yet, he states he tried this years ago and it did not work. Symptoms remain a bother.    IPSS: 13/4.    History of Right Hydrocelectomy  S/P right hydrocelectomy on 10/26/22 with Dr. Bradshaw. No recurrence to date.    Most Recent PSA: 3.06 (08/20/24)    Last BUN and creatinine:  Lab Results   Component Value Date    BUN 17 08/20/2024     Lab Results   Component Value Date    CREATININE 0.8 08/20/2024         PAST MEDICAL, FAMILY AND SOCIAL HISTORY UPDATE:  Past Medical History:   Diagnosis Date    AAA (abdominal aortic aneurysm)     Hydrocele     right    Hypercholesterolemia     Hypertension     Lumbosacral spinal stenosis     Tobacco abuse      Past Surgical History:   Procedure Laterality Date    DENTAL SURGERY  2013    dental    HYDROCELE EXCISION Right 10/26/2022    RIGHT HYDROCELECTOMY performed by Joseph Bradshaw MD at [facility] OR     Family History   Problem Relation Age of Onset    Stroke Mother         2014    Other Mother         migraines    Other Father         pulmonary fibrosisi     Outpatient Medications Marked as Taking for the 3/27/25 encounter (Office Visit) with Hunter Blas PA-C   Medication Sig Dispense Refill    Misc. Devices (ROLLER WALKER) MISC 1 each by Does not apply route daily Adjustable wheeled walker with slides 1 each 0    Misc. Devices (ROLLER WALKER) MISC 1 each by Does not apply route daily Adjustable folding wheeled walker with slides 1 each 0    amLODIPine (NORVASC) 10 MG tablet Take 1 tablet by mouth daily 90 tablet 1    lisinopril (PRINIVIL;ZESTRIL) 20 MG

## 2025-03-28 ENCOUNTER — HOSPITAL ENCOUNTER (OUTPATIENT)
Dept: PHYSICAL THERAPY | Age: 70
Setting detail: THERAPIES SERIES
Discharge: HOME | End: 2025-03-28
Payer: MEDICARE

## 2025-03-28 PROCEDURE — 97112 NEUROMUSCULAR REEDUCATION: CPT

## 2025-03-28 PROCEDURE — 97110 THERAPEUTIC EXERCISES: CPT

## 2025-03-28 NOTE — PROGRESS NOTES
[facility]  PHYSICAL THERAPY  [] EVALUATION  [x] DAILY NOTE (LAND) [] DAILY NOTE (AQUATIC ) [] PROGRESS NOTE [] DISCHARGE NOTE    [x] OUTPATIENT REHABILITATION CENTER [facility]   [] [place] [facility]    [] [facility]   [] [facility]    Date: 3/28/2025  Patient Name:  Jaydon Kramer  : 1955  MRN: [id number]  CSN: [id number]    Referring Practitioner Conchis Miner MD [id number]      Diagnosis  Diagnoses       R26.9 (ICD-10-CM) - Unspecified abnormalities of gait and mobility    X19.XXXS (ICD-10-CM) - Contact with other heat and hot substances, sequela           Treatment Diagnosis R26.81  Unsteadiness on feet  R26.0  Ataxic Gait  R53.1 Weakness   Date of Evaluation 25   Additional Pertinent History Jaydon Kramer has a past medical history of AAA (abdominal aortic aneurysm), Hydrocele, Hypercholesterolemia, Hypertension, Lumbosacral spinal stenosis, and Tobacco abuse.  he has a past surgical history that includes Dental surgery () and Hydrocele surgery (Right, 10/26/2022).     Allergies Allergies   Allergen Reactions    Ciprofloxacin      All fluoroquinolones - known AAA    Levofloxacin      All fluoroquinolones - known AAA        Medications   Current Outpatient Medications:     mirabegron (MYRBETRIQ) 50 MG TB24, Take 50 mg by mouth daily, Disp: 90 tablet, Rfl: 1    Misc. Devices (ROLLER WALKER) MISC, 1 each by Does not apply route daily Adjustable wheeled walker with slides, Disp: 1 each, Rfl: 0    Misc. Devices (ROLLER WALKER) MISC, 1 each by Does not apply route daily Adjustable folding wheeled walker with slides, Disp: 1 each, Rfl: 0    amLODIPine (NORVASC) 10 MG tablet, Take 1 tablet by mouth daily, Disp: 90 tablet, Rfl: 1    lisinopril (PRINIVIL;ZESTRIL) 20 MG tablet, Take 1 tablet by mouth daily, Disp: 90 tablet, Rfl: 1    MULTIPLE VITAMIN PO, Take by mouth daily, Disp: , Rfl:       Functional Outcome Measure Used Parker    Functional Outcome

## 2025-04-01 ENCOUNTER — HOSPITAL ENCOUNTER (OUTPATIENT)
Dept: PHYSICAL THERAPY | Age: 70
Setting detail: THERAPIES SERIES
Discharge: HOME | End: 2025-04-01
Payer: MEDICARE

## 2025-04-01 PROCEDURE — 97112 NEUROMUSCULAR REEDUCATION: CPT

## 2025-04-01 PROCEDURE — 97110 THERAPEUTIC EXERCISES: CPT

## 2025-04-01 NOTE — PROGRESS NOTES
32/56 (2/4/25)       Insurance: Primary: Payor: [employer] /  /  / ,   Secondary: Medicaid   Authorization Information PRE CERTIFICATION REQUIRED: Additional authorization not required.  INSURANCE THERAPY BENEFIT: Visits approved based on medical necessity.. .   AQUATIC THERAPY COVERED: Yes  MODALITIES COVERED:  Yes  $40 copay/visit   Initial CPT Codes Requested 54539 - Therapeutic Exercise, 11217 - Neuromuscular Re-Education  and 37116 - Gait Training   Progress Note Counter 16, 6/10 for progress note (Reporting Period: 3/7/25 to     )   Recertification Date 04/29/25   Physician Follow-Up 4/24/24   Physician Orders    History of Present Illness Pt reports 2 years ago, he was working outside and thought he overdid it the next morning when he woke up with pain in medial knee. Pt went to chiropractor who lasered it and got it feeling better. Pt continued to work. Later in time, he fell and landed on right side while at [employer]. Pt went to ED and no fracture found. Pain eventually went away but he has difficulty using it. Pt has varied between walker and cane, but currently using [employer]. Pt denies recent falls.       SUBJECTIVE: Patient denies adverse effects from his previous treatment. Pt notes \"so so\" compliance c HEP. Pt's family member notes that recent MRI revealed \"a bunch of white matter\" has been referred to Neurologist, awaiting for an appointment.      OBJECTIVE:    TREATMENT   Precautions:    Pain: Denies    \"X” in shaded column indicates activity completed today    “*\" next to exercise/intervention indicates progression   Modalities Parameters/  Location  Notes                     Manual Therapy Time/Technique  Notes                     Exercise/Intervention   Notes   Nustep: seat 13, legs only  5 min  L3 X           Seated:       March 10      LAQ 10x5 sec      Resisted hamstring curl 3x3 sec [place]     Heel toe raises 10             Standing:        HR/TR 15x      3 way hip  3x5   BUE   Feet

## 2025-04-04 ENCOUNTER — APPOINTMENT (OUTPATIENT)
Dept: PHYSICAL THERAPY | Age: 70
End: 2025-04-04
Payer: MEDICARE

## 2025-04-08 ENCOUNTER — HOSPITAL ENCOUNTER (OUTPATIENT)
Dept: PHYSICAL THERAPY | Age: 70
Setting detail: THERAPIES SERIES
Discharge: HOME | End: 2025-04-08
Payer: MEDICARE

## 2025-04-08 PROCEDURE — 97110 THERAPEUTIC EXERCISES: CPT

## 2025-04-08 NOTE — DISCHARGE SUMMARY
Strengthening, Balance Training, Transfer Training, Gait Training, Stair Training, Neuromuscular Re-education, Home Exercise Program, and Patient Education    []  Plan of care initiated.  Plan to see patient 2 times per week for 12 weeks to address the treatment planned outlined above.  []  Continue with current plan of care  []  Modify plan of care as follows:    []  Hold pending physician visit  [x]  Discharge    Time In 1351   Time Out 1435   Timed Code Minutes: 44   Total Treatment Time: 44       Electronically Signed by: Lewis Lemus PT

## 2025-05-08 ENCOUNTER — OFFICE VISIT (OUTPATIENT)
Dept: INTERNAL MEDICINE CLINIC | Age: 70
End: 2025-05-08
Payer: MEDICARE

## 2025-05-08 VITALS
BODY MASS INDEX: 18.39 KG/M2 | HEART RATE: 78 BPM | WEIGHT: 151 LBS | SYSTOLIC BLOOD PRESSURE: 136 MMHG | DIASTOLIC BLOOD PRESSURE: 78 MMHG | HEIGHT: 76 IN | TEMPERATURE: 98.5 F

## 2025-05-08 DIAGNOSIS — R63.4 WEIGHT LOSS: ICD-10-CM

## 2025-05-08 DIAGNOSIS — R26.9 GAIT DISTURBANCE: Primary | ICD-10-CM

## 2025-05-08 DIAGNOSIS — I63.81 MULTIPLE LACUNAR INFARCTS (HCC): ICD-10-CM

## 2025-05-08 DIAGNOSIS — I10 PRIMARY HYPERTENSION: ICD-10-CM

## 2025-05-08 DIAGNOSIS — Z87.891 PERSONAL HISTORY OF TOBACCO USE: ICD-10-CM

## 2025-05-08 PROCEDURE — 3075F SYST BP GE 130 - 139MM HG: CPT

## 2025-05-08 PROCEDURE — 1123F ACP DISCUSS/DSCN MKR DOCD: CPT

## 2025-05-08 PROCEDURE — 3078F DIAST BP <80 MM HG: CPT

## 2025-05-08 PROCEDURE — 99214 OFFICE O/P EST MOD 30 MIN: CPT

## 2025-05-08 RX ORDER — ASPIRIN 81 MG/1
81 TABLET ORAL DAILY
Qty: 90 TABLET | Refills: 0 | Status: SHIPPED | OUTPATIENT
Start: 2025-05-08

## 2025-05-08 RX ORDER — AMLODIPINE BESYLATE 10 MG/1
10 TABLET ORAL DAILY
Qty: 90 TABLET | Refills: 1 | Status: SHIPPED | OUTPATIENT
Start: 2025-05-08

## 2025-05-08 RX ORDER — LISINOPRIL 20 MG/1
20 TABLET ORAL DAILY
Qty: 90 TABLET | Refills: 1 | Status: SHIPPED | OUTPATIENT
Start: 2025-05-08

## 2025-05-08 NOTE — PATIENT INSTRUCTIONS
Take baby aspirin 81 mg daily   Please get blood work done   Decrease smoking   Follow up with neurology

## 2025-05-08 NOTE — PROGRESS NOTES
1955    Chief Complaint   Patient presents with    f/u on gait      3 months     Hypertension       Pt is a 69 y.o. male who presents for a follow up visit.     Pt had colonoscopy done 1 month ago, had several polyps removed and will return in 3 years for repeat. He has completed PT and states he's had some Improvement in his gait after PT. Pt states he has been eating okay, focusing on meat like hamburgers. Recently started on mirabegron for prostate without much improvement. He is still smoking less than 1 pack per day.           Past Medical History:   Diagnosis Date    AAA (abdominal aortic aneurysm)     Hydrocele     right    Hypercholesterolemia     Hypertension     Lumbosacral spinal stenosis     Tobacco abuse        Past Surgical History:   Procedure Laterality Date    DENTAL SURGERY  2013    dental    HYDROCELE EXCISION Right 10/26/2022    RIGHT HYDROCELECTOMY performed by Joseph [facility]shaw MD at [facility] OR       Current Outpatient Medications   Medication Sig Dispense Refill    amLODIPine (NORVASC) 10 MG tablet Take 1 tablet by mouth daily 90 tablet 1    lisinopril (PRINIVIL;ZESTRIL) 20 MG tablet Take 1 tablet by mouth daily 90 tablet 1    aspirin 81 MG EC tablet Take 1 tablet by mouth daily 90 tablet 0    mirabegron (MYRBETRIQ) 50 MG TB24 Take 50 mg by mouth daily 90 tablet 1    Misc. Devices (ROLLER WALKER) MISC 1 each by Does not apply route daily Adjustable wheeled walker with slides 1 each 0    Misc. Devices (ROLLER WALKER) MISC 1 each by Does not apply route daily Adjustable folding wheeled walker with slides 1 each 0    MULTIPLE VITAMIN PO Take by mouth daily       No current facility-administered medications for this visit.       Allergies   Allergen Reactions    Ciprofloxacin      All fluoroquinolones - known AAA    Levofloxacin      All fluoroquinolones - known AAA         Social History     Socioeconomic History    Marital status:      Spouse name: Not on file    Number of children: 3

## 2025-05-09 ENCOUNTER — LAB (OUTPATIENT)
Dept: LAB | Age: 70
End: 2025-05-09

## 2025-05-09 DIAGNOSIS — I10 PRIMARY HYPERTENSION: ICD-10-CM

## 2025-05-09 DIAGNOSIS — I63.81 MULTIPLE LACUNAR INFARCTS (HCC): ICD-10-CM

## 2025-05-09 LAB
ALBUMIN SERPL BCG-MCNC: 4.1 G/DL (ref 3.4–4.9)
ALP SERPL-CCNC: 143 U/L (ref 40–129)
ALT SERPL W/O P-5'-P-CCNC: 14 U/L (ref 10–50)
ANION GAP SERPL CALC-SCNC: 9 MEQ/L (ref 8–16)
AST SERPL-CCNC: 13 U/L (ref 10–50)
BILIRUB CONJ SERPL-MCNC: 0.2 MG/DL (ref 0–0.2)
BILIRUB SERPL-MCNC: 0.4 MG/DL (ref 0.3–1.2)
BUN SERPL-MCNC: 19 MG/DL (ref 8–23)
CALCIUM SERPL-MCNC: 9.6 MG/DL (ref 8.8–10.2)
CHLORIDE SERPL-SCNC: 103 MEQ/L (ref 98–111)
CHOLEST SERPL-MCNC: 168 MG/DL (ref 100–199)
CO2 SERPL-SCNC: 27 MEQ/L (ref 22–29)
CREAT SERPL-MCNC: 0.9 MG/DL (ref 0.7–1.2)
GFR SERPL CREATININE-BSD FRML MDRD: > 90 ML/MIN/1.73M2
GLUCOSE SERPL-MCNC: 96 MG/DL (ref 74–109)
HDLC SERPL-MCNC: 57 MG/DL
LDLC SERPL CALC-MCNC: 101 MG/DL
POTASSIUM SERPL-SCNC: 4.7 MEQ/L (ref 3.5–5.2)
PROT SERPL-MCNC: 7.2 G/DL (ref 6.4–8.3)
SODIUM SERPL-SCNC: 139 MEQ/L (ref 135–145)
TRIGL SERPL-MCNC: 50 MG/DL (ref 0–199)

## 2025-05-21 ENCOUNTER — OFFICE VISIT (OUTPATIENT)
Dept: NEUROLOGY | Age: 70
End: 2025-05-21
Payer: MEDICARE

## 2025-05-21 VITALS
WEIGHT: 153 LBS | BODY MASS INDEX: 18.63 KG/M2 | OXYGEN SATURATION: 98 % | SYSTOLIC BLOOD PRESSURE: 122 MMHG | DIASTOLIC BLOOD PRESSURE: 80 MMHG | HEART RATE: 66 BPM | HEIGHT: 76 IN

## 2025-05-21 DIAGNOSIS — R29.898 RIGHT LEG WEAKNESS: ICD-10-CM

## 2025-05-21 DIAGNOSIS — Z86.73 HISTORY OF STROKE: Primary | ICD-10-CM

## 2025-05-21 DIAGNOSIS — M89.9 DISORDER OF BONE, UNSPECIFIED: ICD-10-CM

## 2025-05-21 DIAGNOSIS — R26.0 ATAXIC GAIT: ICD-10-CM

## 2025-05-21 DIAGNOSIS — R47.1 DYSARTHRIA: ICD-10-CM

## 2025-05-21 DIAGNOSIS — R26.89 BALANCE PROBLEM: ICD-10-CM

## 2025-05-21 PROCEDURE — 3079F DIAST BP 80-89 MM HG: CPT | Performed by: PSYCHIATRY & NEUROLOGY

## 2025-05-21 PROCEDURE — 1123F ACP DISCUSS/DSCN MKR DOCD: CPT | Performed by: PSYCHIATRY & NEUROLOGY

## 2025-05-21 PROCEDURE — 99205 OFFICE O/P NEW HI 60 MIN: CPT | Performed by: PSYCHIATRY & NEUROLOGY

## 2025-05-21 PROCEDURE — 1159F MED LIST DOCD IN RCRD: CPT | Performed by: PSYCHIATRY & NEUROLOGY

## 2025-05-21 PROCEDURE — 3074F SYST BP LT 130 MM HG: CPT | Performed by: PSYCHIATRY & NEUROLOGY

## 2025-05-21 NOTE — PATIENT INSTRUCTIONS
Continue with aspirin 81 mg daily   CTA head and neck W/Wo contrast  Cardiac echo  You need to discuss options of lipid lowering medications with your PCP, as most recent LDL done 5/9/25=101.   Target LDL below 70  Homocystine level  Vitamin B12, folate  Vitamin B6 level  Vitamin D level  Copper level  Modify risk factors for stroke (blood pressure, cholesterol, diabetes, smoking cessation etc.. Control)  Call with any new symptoms or concerns.   Follow up in 2 months.

## 2025-05-27 NOTE — PROGRESS NOTES
Chief Complaint   Patient presents with    Consultation/Balance problem     Jaydon Kramer is a 69 y.o. male who presents today for evaluation of abnormal MRI brain initially ordered by spine specialist, for balance trouble. He does admit to slurred speech that began a couple of years ago. 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 Multiple old microhemorrhages in the deep white matter with a few old micro hemorrhages in the periphery. This can indicate underlying hypertension or amyloid angiopathy.  Global volume loss. MRI cervical spine done 2/27/25 showed Normal signal in the cervical spinal cord. Mild degenerative changes in the cervical spine which are most pronounced at the C6-7 level. There is minimal spinal canal stenosis at this level with moderate severity bilateral foraminal stenosis. Degenerative changes at the other cervical levels as described. MRI thoracic spine WO contrast done 2/27/25 showed Mild reduction in the caliber of the thoracic spinal cord. No abnormal signal.  Mild facet degenerative changes in the lower thoracic spine. There is no spinal canal or foraminal stenosis.He is now using a walker, since using this he has not fallen. He is not diabetic. He has high blood pressure, well controlled with medications. No history of head injury. He recently had lipid panel done 5/9/25 OBC=529. He smokes, under a pack of cigarettes a day.  He denies chest pain. No shortness of breath, no neck pain. No back pain. No difficulty swallowing. No vision changes. No dysphagia. No fever. No rash. He has lost weight approximately 10 pounds in the past 1 year. History provided by patient accompanied by family.       Past Medical History:   Diagnosis Date    AAA (abdominal aortic aneurysm)     Hydrocele     right    Hypercholesterolemia     Hypertension     Lumbosacral spinal stenosis     Tobacco

## 2025-05-30 DIAGNOSIS — I10 PRIMARY HYPERTENSION: ICD-10-CM

## 2025-05-30 DIAGNOSIS — E78.5 DYSLIPIDEMIA: Primary | ICD-10-CM

## 2025-05-31 RX ORDER — ROSUVASTATIN CALCIUM 20 MG/1
20 TABLET, COATED ORAL DAILY
Qty: 30 TABLET | Refills: 1 | Status: SHIPPED | OUTPATIENT
Start: 2025-05-31

## 2025-07-28 ENCOUNTER — HOSPITAL ENCOUNTER (OUTPATIENT)
Dept: CT IMAGING | Age: 70
Discharge: HOME | End: 2025-07-28
Payer: MEDICARE

## 2025-07-28 ENCOUNTER — LAB (OUTPATIENT)
Dept: LAB | Age: 70
End: 2025-07-28

## 2025-07-28 ENCOUNTER — RESULTS FOLLOW-UP (OUTPATIENT)
Dept: NEUROLOGY | Age: 70
End: 2025-07-28

## 2025-07-28 DIAGNOSIS — R26.89 BALANCE PROBLEM: ICD-10-CM

## 2025-07-28 DIAGNOSIS — R47.1 DYSARTHRIA: ICD-10-CM

## 2025-07-28 DIAGNOSIS — R29.898 RIGHT LEG WEAKNESS: ICD-10-CM

## 2025-07-28 DIAGNOSIS — R26.0 ATAXIC GAIT: ICD-10-CM

## 2025-07-28 DIAGNOSIS — M89.9 DISORDER OF BONE, UNSPECIFIED: ICD-10-CM

## 2025-07-28 DIAGNOSIS — Z86.73 HISTORY OF STROKE: ICD-10-CM

## 2025-07-28 LAB
25(OH)D3 SERPL-MCNC: 24 NG/ML (ref 30–100)
FOLATE SERPL-MCNC: > 20 NG/ML (ref 4.6–34.8)
POC CREATININE WHOLE BLOOD: 0.9 MG/DL (ref 0.5–1.2)
VIT B12 SERPL-MCNC: 415 PG/ML (ref 232–1245)

## 2025-07-28 PROCEDURE — 70496 CT ANGIOGRAPHY HEAD: CPT

## 2025-07-28 PROCEDURE — 70498 CT ANGIOGRAPHY NECK: CPT

## 2025-07-28 PROCEDURE — 82565 ASSAY OF CREATININE: CPT

## 2025-07-28 PROCEDURE — 6360000004 HC RX CONTRAST MEDICATION: Performed by: NURSE PRACTITIONER

## 2025-07-28 RX ORDER — IOPAMIDOL 755 MG/ML
80 INJECTION, SOLUTION INTRAVASCULAR
Status: COMPLETED | OUTPATIENT
Start: 2025-07-28 | End: 2025-07-28

## 2025-07-28 RX ADMIN — IOPAMIDOL 80 ML: 755 INJECTION, SOLUTION INTRAVENOUS at 15:29

## 2025-07-29 LAB — HOMOCYSTEINE: 11.3 UMOL/L (ref 0–15)

## 2025-07-29 NOTE — RESULT ENCOUNTER NOTE
Please let patient know CTA head and neck show 70* right  ICA and 50 % left ICA stenosis, needs to be evaluated by our interventional group, if agreeable will place referral .  Bonnie Varghese MD 9:06 PM

## 2025-07-29 NOTE — RESULT ENCOUNTER NOTE
Please ask patient to start vitamin D supplements daily over the counter (preferably 5000 IU daily), not multivitamins. The level is low = 24. Repeat level in 3-4 months.

## 2025-07-31 LAB — COPPER SERPL-MCNC: 148.9 UG/DL (ref 70–140)

## 2025-08-01 LAB — PYRIDOXAL PHOS SERPL-SCNC: 21.9 NMOL/L (ref 20–125)

## 2025-08-04 LAB — MISC. #1 REFERENCE GROUP TEST: NORMAL

## 2025-08-05 RX ORDER — ROSUVASTATIN CALCIUM 20 MG/1
20 TABLET, COATED ORAL DAILY
Qty: 30 TABLET | Refills: 1 | Status: SHIPPED | OUTPATIENT
Start: 2025-08-05

## 2025-08-07 ENCOUNTER — HOSPITAL ENCOUNTER (OUTPATIENT)
Age: 70
Discharge: HOME | End: 2025-08-09
Payer: MEDICARE

## 2025-08-07 DIAGNOSIS — R47.1 DYSARTHRIA: ICD-10-CM

## 2025-08-07 DIAGNOSIS — R26.0 ATAXIC GAIT: ICD-10-CM

## 2025-08-07 DIAGNOSIS — R29.898 RIGHT LEG WEAKNESS: ICD-10-CM

## 2025-08-07 DIAGNOSIS — Z86.73 HISTORY OF STROKE: ICD-10-CM

## 2025-08-07 DIAGNOSIS — R26.89 BALANCE PROBLEM: ICD-10-CM

## 2025-08-07 LAB
ECHO AO ASC DIAM: 4.4 CM
ECHO AV CUSP MM: 2.2 CM
ECHO AV MEAN GRADIENT: 5 MMHG
ECHO AV MEAN VELOCITY: 1.1 M/S
ECHO AV PEAK GRADIENT: 9 MMHG
ECHO AV PEAK VELOCITY: 1.5 M/S
ECHO AV VELOCITY RATIO: 0.87
ECHO AV VTI: 27.7 CM
ECHO EST RA PRESSURE: 3 MMHG
ECHO LA AREA 2C: 17.8 CM2
ECHO LA AREA 4C: 13.9 CM2
ECHO LA DIAMETER: 3.2 CM
ECHO LA MAJOR AXIS: 4.8 CM
ECHO LA MINOR AXIS: 5.6 CM
ECHO LA VOL BP: 41 ML (ref 18–58)
ECHO LA VOL MOD A2C: 48 ML (ref 18–58)
ECHO LA VOL MOD A4C: 30 ML (ref 18–58)
ECHO LV E' LATERAL VELOCITY: 9.5 CM/S
ECHO LV E' SEPTAL VELOCITY: 7.6 CM/S
ECHO LV EDV A2C: 99 ML
ECHO LV EDV A4C: 94 ML
ECHO LV EF PHYSICIAN: 55 %
ECHO LV EJECTION FRACTION A2C: 60 %
ECHO LV EJECTION FRACTION A4C: 61 %
ECHO LV EJECTION FRACTION BIPLANE: 61 % (ref 55–100)
ECHO LV ESV A2C: 40 ML
ECHO LV ESV A4C: 37 ML
ECHO LV FRACTIONAL SHORTENING: 30 % (ref 28–44)
ECHO LV INTERNAL DIMENSION DIASTOLIC: 4.3 CM (ref 4.2–5.9)
ECHO LV INTERNAL DIMENSION SYSTOLIC: 3 CM
ECHO LV ISOVOLUMETRIC RELAXATION TIME (IVRT): 95 MS
ECHO LV IVSD: 1.2 CM (ref 0.6–1)
ECHO LV MASS 2D: 184.7 G (ref 88–224)
ECHO LV POSTERIOR WALL DIASTOLIC: 1.2 CM (ref 0.6–1)
ECHO LV RELATIVE WALL THICKNESS RATIO: 0.56
ECHO LVOT AV VTI INDEX: 0.82
ECHO LVOT MEAN GRADIENT: 3 MMHG
ECHO LVOT PEAK GRADIENT: 7 MMHG
ECHO LVOT PEAK VELOCITY: 1.3 M/S
ECHO LVOT VTI: 22.7 CM
ECHO MV A VELOCITY: 0.97 M/S
ECHO MV E DECELERATION TIME (DT): 285 MS
ECHO MV E VELOCITY: 0.55 M/S
ECHO MV E/A RATIO: 0.57
ECHO MV E/E' LATERAL: 5.79
ECHO MV E/E' RATIO (AVERAGED): 6.51
ECHO MV E/E' SEPTAL: 7.24
ECHO PV MAX VELOCITY: 0.7 M/S
ECHO PV PEAK GRADIENT: 2 MMHG
ECHO RIGHT VENTRICULAR SYSTOLIC PRESSURE (RVSP): 28 MMHG
ECHO RV INTERNAL DIMENSION: 4.2 CM
ECHO RV TAPSE: 2 CM (ref 1.7–?)
ECHO TV E WAVE: 0.5 M/S
ECHO TV REGURGITANT MAX VELOCITY: 2.51 M/S
ECHO TV REGURGITANT PEAK GRADIENT: 25 MMHG

## 2025-08-07 PROCEDURE — 93306 TTE W/DOPPLER COMPLETE: CPT

## 2025-08-07 PROCEDURE — 93306 TTE W/DOPPLER COMPLETE: CPT | Performed by: INTERNAL MEDICINE

## 2025-08-08 ENCOUNTER — LAB (OUTPATIENT)
Dept: LAB | Age: 70
End: 2025-08-08

## 2025-08-08 ENCOUNTER — OFFICE VISIT (OUTPATIENT)
Dept: NEUROLOGY | Age: 70
End: 2025-08-08
Payer: MEDICARE

## 2025-08-08 VITALS
HEIGHT: 76 IN | HEART RATE: 92 BPM | WEIGHT: 150.2 LBS | BODY MASS INDEX: 18.29 KG/M2 | SYSTOLIC BLOOD PRESSURE: 122 MMHG | DIASTOLIC BLOOD PRESSURE: 74 MMHG | OXYGEN SATURATION: 97 %

## 2025-08-08 DIAGNOSIS — R47.1 DYSARTHRIA: ICD-10-CM

## 2025-08-08 DIAGNOSIS — Z86.73 HISTORY OF STROKE: ICD-10-CM

## 2025-08-08 DIAGNOSIS — R26.0 ATAXIC GAIT: ICD-10-CM

## 2025-08-08 DIAGNOSIS — Z86.73 HISTORY OF STROKE: Primary | ICD-10-CM

## 2025-08-08 DIAGNOSIS — R26.89 BALANCE PROBLEM: ICD-10-CM

## 2025-08-08 DIAGNOSIS — R29.898 RIGHT LEG WEAKNESS: ICD-10-CM

## 2025-08-08 DIAGNOSIS — I63.89 OTHER CEREBRAL INFARCTION (HCC): ICD-10-CM

## 2025-08-08 DIAGNOSIS — I67.858 OTHER HEREDITARY CEREBROVASCULAR DISEASE: ICD-10-CM

## 2025-08-08 DIAGNOSIS — I65.21 STENOSIS OF RIGHT CAROTID ARTERY: ICD-10-CM

## 2025-08-08 LAB
CRP SERPL-MCNC: 1.64 MG/DL (ref 0–0.5)
ENA SS-A 60KD AB SER-ACNC: NORMAL
ERYTHROCYTE [SEDIMENTATION RATE] IN BLOOD BY WESTERGREN METHOD: 18 MM/HR (ref 0–20)
FIBRINOGEN PPP-MCNC: 587 MG/100ML (ref 155–475)
MTHFR BY PCR SPECIMEN: NORMAL
PT PCR SPECIMEN: NORMAL
RHEUMATOID FACT SERPL-ACNC: < 10 IU/ML (ref 0–13)

## 2025-08-08 PROCEDURE — 3074F SYST BP LT 130 MM HG: CPT | Performed by: NURSE PRACTITIONER

## 2025-08-08 PROCEDURE — 1123F ACP DISCUSS/DSCN MKR DOCD: CPT | Performed by: NURSE PRACTITIONER

## 2025-08-08 PROCEDURE — 1159F MED LIST DOCD IN RCRD: CPT | Performed by: NURSE PRACTITIONER

## 2025-08-08 PROCEDURE — 99215 OFFICE O/P EST HI 40 MIN: CPT | Performed by: NURSE PRACTITIONER

## 2025-08-08 PROCEDURE — 3078F DIAST BP <80 MM HG: CPT | Performed by: NURSE PRACTITIONER

## 2025-08-11 ENCOUNTER — TELEPHONE (OUTPATIENT)
Dept: CARDIOLOGY CLINIC | Age: 70
End: 2025-08-11

## 2025-08-11 ENCOUNTER — RESULTS FOLLOW-UP (OUTPATIENT)
Dept: NEUROLOGY | Age: 70
End: 2025-08-11

## 2025-08-11 DIAGNOSIS — R26.89 BALANCE PROBLEM: ICD-10-CM

## 2025-08-11 DIAGNOSIS — I65.21 STENOSIS OF RIGHT CAROTID ARTERY: ICD-10-CM

## 2025-08-11 DIAGNOSIS — R26.0 ATAXIC GAIT: ICD-10-CM

## 2025-08-11 DIAGNOSIS — Z86.73 HISTORY OF STROKE: Primary | ICD-10-CM

## 2025-08-11 DIAGNOSIS — R47.1 DYSARTHRIA: ICD-10-CM

## 2025-08-11 DIAGNOSIS — R29.898 RIGHT LEG WEAKNESS: ICD-10-CM

## 2025-08-11 LAB — NUCLEAR IGG SER QL IA: NORMAL

## 2025-08-12 LAB
AT III ACT/NOR PPP CHRO: 114 % (ref 76–128)
CARDIOLIPIN IGG SER IA-ACNC: < 10 GPL
CARDIOLIPIN IGM SER IA-ACNC: < 10 MPL
DSDNA IGG SER QL IA: NORMAL
ENA SS-A 60KD AB SER-ACNC: NORMAL
ENA SS-A IGG SER QL: NORMAL
ENA SS-B IGG SER IA-ACNC: 1 AU/ML (ref 0–40)
INTERPRETATION SERPL IFE-IMP: NORMAL
PAI1 AG PPP IA-ACNC: NORMAL
PROT C ACT/NOR PPP: 112 % (ref 83–168)
PROT C AG ACT/NOR PPP IA: > 95 % (ref 63–153)
PROT S AG ACT/NOR PPP IA: 132 % (ref 84–134)

## 2025-08-13 ENCOUNTER — HOSPITAL ENCOUNTER (OUTPATIENT)
Age: 70
Discharge: HOME | End: 2025-08-15
Payer: MEDICARE

## 2025-08-13 DIAGNOSIS — R29.898 RIGHT LEG WEAKNESS: ICD-10-CM

## 2025-08-13 DIAGNOSIS — I67.858 OTHER HEREDITARY CEREBROVASCULAR DISEASE: ICD-10-CM

## 2025-08-13 DIAGNOSIS — Z86.73 HISTORY OF STROKE: ICD-10-CM

## 2025-08-13 DIAGNOSIS — R26.89 BALANCE PROBLEM: ICD-10-CM

## 2025-08-13 DIAGNOSIS — R47.1 DYSARTHRIA: ICD-10-CM

## 2025-08-13 DIAGNOSIS — R26.0 ATAXIC GAIT: ICD-10-CM

## 2025-08-13 LAB
CONFIRM DRVVT STA-STACLOT: NORMAL S
DRVVT SCREEN TO CONFIRM RATIO: NORMAL {RATIO}
DRVVT/DRVVT CFM P DOAC NEUT NORM PPP-RTO: NORMAL {RATIO}
HEPARIN NT PPP QL: NORMAL
LA 3 SCREEN W REFLEX-IMP: NORMAL
LMW HEPARIN IND PLT AB SER QL: NORMAL
MIXING DRVVT/NORMAL: NORMAL %
PROT S FREE AG ACT/NOR PPP IA: NORMAL %
PROTHROMBIN TIME: 13.3 S (ref 12–15.5)
SCREEN APTT/NORMAL: 1.17
SCREEN APTT/NORMAL: NORMAL
SCREEN DRVVT/NORMAL: 1.17 %
THROMBIN TIME: NORMAL S

## 2025-08-13 PROCEDURE — 93270 REMOTE 30 DAY ECG REV/REPORT: CPT

## 2025-08-15 LAB
F2 C.20210G>A GENO BLD/T: NEGATIVE
F5 P.R506Q BLD/T QL: ABNORMAL
MTHFR BY PCR SPECIMEN: ABNORMAL
SPECIMEN SOURCE: ABNORMAL

## 2025-08-18 ENCOUNTER — TELEPHONE (OUTPATIENT)
Dept: CARDIOTHORACIC SURGERY | Age: 70
End: 2025-08-18

## 2025-08-18 ENCOUNTER — OFFICE VISIT (OUTPATIENT)
Dept: CARDIOLOGY CLINIC | Age: 70
End: 2025-08-18
Payer: MEDICARE

## 2025-08-18 VITALS
HEIGHT: 76 IN | DIASTOLIC BLOOD PRESSURE: 87 MMHG | HEART RATE: 79 BPM | SYSTOLIC BLOOD PRESSURE: 133 MMHG | WEIGHT: 150 LBS | BODY MASS INDEX: 18.27 KG/M2

## 2025-08-18 DIAGNOSIS — Z86.73 HX OF ARTERIAL ISCHEMIC STROKE: ICD-10-CM

## 2025-08-18 DIAGNOSIS — I65.23 BILATERAL CAROTID ARTERY STENOSIS: ICD-10-CM

## 2025-08-18 DIAGNOSIS — I71.21 ANEURYSM OF ASCENDING AORTA WITHOUT RUPTURE: ICD-10-CM

## 2025-08-18 DIAGNOSIS — Q21.12 PFO (PATENT FORAMEN OVALE): ICD-10-CM

## 2025-08-18 DIAGNOSIS — I63.9 CEREBROVASCULAR ACCIDENT (CVA), UNSPECIFIED MECHANISM (HCC): ICD-10-CM

## 2025-08-18 DIAGNOSIS — Q24.8 INTERATRIAL CARDIAC SHUNT: Primary | ICD-10-CM

## 2025-08-18 DIAGNOSIS — F17.219 CIGARETTE NICOTINE DEPENDENCE WITH NICOTINE-INDUCED DISORDER: ICD-10-CM

## 2025-08-18 DIAGNOSIS — E78.2 MIXED HYPERLIPIDEMIA: ICD-10-CM

## 2025-08-18 DIAGNOSIS — I10 ESSENTIAL HYPERTENSION: ICD-10-CM

## 2025-08-18 LAB
F2 C.20210G>A GENO BLD/T: NEGATIVE
F5 P.R506Q BLD/T QL: ABNORMAL
MTHFR C.1298A>C GENO BLD/T: ABNORMAL
MTHFR C.677C>T GENO BLD/T: NEGATIVE
MTHFR GENE MUT ANL BLD/T: ABNORMAL
SPECIMEN SOURCE: ABNORMAL
SPECIMEN SOURCE: ABNORMAL

## 2025-08-18 PROCEDURE — 99406 BEHAV CHNG SMOKING 3-10 MIN: CPT | Performed by: INTERNAL MEDICINE

## 2025-08-18 PROCEDURE — 1159F MED LIST DOCD IN RCRD: CPT | Performed by: INTERNAL MEDICINE

## 2025-08-18 PROCEDURE — 3079F DIAST BP 80-89 MM HG: CPT | Performed by: INTERNAL MEDICINE

## 2025-08-18 PROCEDURE — 93000 ELECTROCARDIOGRAM COMPLETE: CPT | Performed by: INTERNAL MEDICINE

## 2025-08-18 PROCEDURE — 1160F RVW MEDS BY RX/DR IN RCRD: CPT | Performed by: INTERNAL MEDICINE

## 2025-08-18 PROCEDURE — 99204 OFFICE O/P NEW MOD 45 MIN: CPT | Performed by: INTERNAL MEDICINE

## 2025-08-18 PROCEDURE — 1123F ACP DISCUSS/DSCN MKR DOCD: CPT | Performed by: INTERNAL MEDICINE

## 2025-08-18 PROCEDURE — 3075F SYST BP GE 130 - 139MM HG: CPT | Performed by: INTERNAL MEDICINE

## 2025-08-18 RX ORDER — ROSUVASTATIN CALCIUM 40 MG/1
40 TABLET, COATED ORAL DAILY
Qty: 90 TABLET | Refills: 3 | Status: SHIPPED | OUTPATIENT
Start: 2025-08-18

## 2025-08-18 RX ORDER — SENNOSIDES 8.6 MG/1
TABLET ORAL
COMMUNITY
Start: 2025-07-30

## 2025-08-22 ENCOUNTER — TELEPHONE (OUTPATIENT)
Dept: NEUROLOGY | Age: 70
End: 2025-08-22

## 2025-08-25 ENCOUNTER — OFFICE VISIT (OUTPATIENT)
Dept: INTERNAL MEDICINE CLINIC | Age: 70
End: 2025-08-25

## 2025-08-25 VITALS
WEIGHT: 150 LBS | BODY MASS INDEX: 18.27 KG/M2 | HEIGHT: 76 IN | OXYGEN SATURATION: 98 % | SYSTOLIC BLOOD PRESSURE: 134 MMHG | DIASTOLIC BLOOD PRESSURE: 88 MMHG | HEART RATE: 74 BPM

## 2025-08-25 DIAGNOSIS — Z00.00 INITIAL MEDICARE ANNUAL WELLNESS VISIT: Primary | ICD-10-CM

## 2025-08-25 DIAGNOSIS — Z86.73 HISTORY OF STROKE: ICD-10-CM

## 2025-08-25 PROBLEM — M48.02 SPINAL STENOSIS OF CERVICAL REGION: Status: ACTIVE | Noted: 2025-08-25

## 2025-08-25 PROBLEM — M50.30 DDD (DEGENERATIVE DISC DISEASE), CERVICAL: Status: ACTIVE | Noted: 2025-08-25

## 2025-08-25 PROBLEM — M48.062 SPINAL STENOSIS, LUMBAR REGION WITH NEUROGENIC CLAUDICATION: Status: ACTIVE | Noted: 2025-08-25

## 2025-08-25 PROBLEM — K59.00 CONSTIPATION: Status: ACTIVE | Noted: 2025-08-25

## 2025-08-25 RX ORDER — PSEUDOEPHEDRINE HCL 30 MG
TABLET ORAL DAILY PRN
COMMUNITY
Start: 2025-07-30

## 2025-08-25 ASSESSMENT — PATIENT HEALTH QUESTIONNAIRE - PHQ9
SUM OF ALL RESPONSES TO PHQ QUESTIONS 1-9: 10
7. TROUBLE CONCENTRATING ON THINGS, SUCH AS READING THE NEWSPAPER OR WATCHING TELEVISION: NOT AT ALL
4. FEELING TIRED OR HAVING LITTLE ENERGY: NEARLY EVERY DAY
9. THOUGHTS THAT YOU WOULD BE BETTER OFF DEAD, OR OF HURTING YOURSELF: NOT AT ALL
SUM OF ALL RESPONSES TO PHQ QUESTIONS 1-9: 10
6. FEELING BAD ABOUT YOURSELF - OR THAT YOU ARE A FAILURE OR HAVE LET YOURSELF OR YOUR FAMILY DOWN: NOT AT ALL
8. MOVING OR SPEAKING SO SLOWLY THAT OTHER PEOPLE COULD HAVE NOTICED. OR THE OPPOSITE, BEING SO FIGETY OR RESTLESS THAT YOU HAVE BEEN MOVING AROUND A LOT MORE THAN USUAL: SEVERAL DAYS
1. LITTLE INTEREST OR PLEASURE IN DOING THINGS: MORE THAN HALF THE DAYS
2. FEELING DOWN, DEPRESSED OR HOPELESS: SEVERAL DAYS
SUM OF ALL RESPONSES TO PHQ QUESTIONS 1-9: 10
5. POOR APPETITE OR OVEREATING: NOT AT ALL
3. TROUBLE FALLING OR STAYING ASLEEP: NEARLY EVERY DAY
10. IF YOU CHECKED OFF ANY PROBLEMS, HOW DIFFICULT HAVE THESE PROBLEMS MADE IT FOR YOU TO DO YOUR WORK, TAKE CARE OF THINGS AT HOME, OR GET ALONG WITH OTHER PEOPLE: NOT DIFFICULT AT ALL
SUM OF ALL RESPONSES TO PHQ QUESTIONS 1-9: 10

## 2025-08-25 ASSESSMENT — LIFESTYLE VARIABLES
HOW MANY STANDARD DRINKS CONTAINING ALCOHOL DO YOU HAVE ON A TYPICAL DAY: PATIENT DOES NOT DRINK
HOW OFTEN DO YOU HAVE A DRINK CONTAINING ALCOHOL: NEVER

## 2025-08-26 ENCOUNTER — LAB (OUTPATIENT)
Dept: LAB | Age: 70
End: 2025-08-26

## 2025-08-26 ENCOUNTER — OFFICE VISIT (OUTPATIENT)
Dept: NEUROLOGY | Age: 70
End: 2025-08-26
Payer: MEDICARE

## 2025-08-26 VITALS — DIASTOLIC BLOOD PRESSURE: 87 MMHG | SYSTOLIC BLOOD PRESSURE: 133 MMHG | HEART RATE: 99 BPM

## 2025-08-26 DIAGNOSIS — R26.1 SPASTIC GAIT: ICD-10-CM

## 2025-08-26 DIAGNOSIS — I65.23 CAROTID STENOSIS, BILATERAL: Primary | ICD-10-CM

## 2025-08-26 DIAGNOSIS — R29.818 EXTRAPYRAMIDAL SYMPTOM: ICD-10-CM

## 2025-08-26 PROCEDURE — 1124F ACP DISCUSS-NO DSCNMKR DOCD: CPT

## 2025-08-26 PROCEDURE — 3075F SYST BP GE 130 - 139MM HG: CPT

## 2025-08-26 PROCEDURE — 1159F MED LIST DOCD IN RCRD: CPT

## 2025-08-26 PROCEDURE — 99205 OFFICE O/P NEW HI 60 MIN: CPT

## 2025-08-26 PROCEDURE — 3079F DIAST BP 80-89 MM HG: CPT

## 2025-08-26 RX ORDER — CLOPIDOGREL BISULFATE 75 MG/1
75 TABLET ORAL DAILY
Qty: 90 TABLET | Refills: 1 | Status: SHIPPED | OUTPATIENT
Start: 2025-08-26

## 2025-08-26 RX ORDER — CARBIDOPA AND LEVODOPA 25; 100 MG/1; MG/1
0.5 TABLET ORAL
Qty: 90 TABLET | Refills: 1 | Status: SHIPPED | OUTPATIENT
Start: 2025-08-26

## 2025-08-26 ASSESSMENT — ENCOUNTER SYMPTOMS: SHORTNESS OF BREATH: 0

## 2025-08-27 ENCOUNTER — TELEPHONE (OUTPATIENT)
Dept: NEUROLOGY | Age: 70
End: 2025-08-27

## 2025-08-28 ENCOUNTER — TELEPHONE (OUTPATIENT)
Dept: NEUROLOGY | Age: 70
End: 2025-08-28

## 2025-08-29 LAB — ZINC SERPL-MCNC: 57.3 UG/DL (ref 60–120)

## (undated) DEVICE — Z DISCONTINUED USE 2275686 GLOVE SURG SZ 8 L12IN FNGR THK13MIL WHT ISOLEX POLYISOPRENE

## (undated) DEVICE — SUTURE VCRL SZ 4-0 L27IN ABSRB UD L17MM RB-1 1/2 CIR J214H

## (undated) DEVICE — TUBING, SUCTION, 1/4" X 20', STRAIGHT: Brand: MEDLINE INDUSTRIES, INC.

## (undated) DEVICE — Device

## (undated) DEVICE — SYRINGE MED 10ML LUERLOCK TIP W/O SFTY DISP

## (undated) DEVICE — DRAIN SURG PENROSE 0.5X18 IN CLOSED WND DRAINAGE PREM SIL

## (undated) DEVICE — GOWN,SIRUS,NON REINFRCD,LARGE,SET IN SL: Brand: MEDLINE

## (undated) DEVICE — ELECTRODE PT RET AD L9FT HI MOIST COND ADH HYDRGEL CORDED

## (undated) DEVICE — SOLUTION IV IRRIG POUR BRL 0.9% SODIUM CHL 2F7124

## (undated) DEVICE — YANKAUER,FLEXIBLE HANDLE,REGLR CAPACITY: Brand: MEDLINE INDUSTRIES, INC.

## (undated) DEVICE — HYPODERMIC SAFETY NEEDLE: Brand: MAGELLAN